# Patient Record
Sex: FEMALE | Race: WHITE | NOT HISPANIC OR LATINO | Employment: UNEMPLOYED | URBAN - NONMETROPOLITAN AREA
[De-identification: names, ages, dates, MRNs, and addresses within clinical notes are randomized per-mention and may not be internally consistent; named-entity substitution may affect disease eponyms.]

---

## 2017-03-17 ENCOUNTER — OFFICE VISIT (OUTPATIENT)
Dept: FAMILY MEDICINE CLINIC | Facility: CLINIC | Age: 42
End: 2017-03-17

## 2017-03-17 VITALS
DIASTOLIC BLOOD PRESSURE: 80 MMHG | BODY MASS INDEX: 32.47 KG/M2 | OXYGEN SATURATION: 95 % | HEART RATE: 100 BPM | WEIGHT: 202 LBS | SYSTOLIC BLOOD PRESSURE: 120 MMHG | HEIGHT: 66 IN

## 2017-03-17 DIAGNOSIS — Z00.00 WELL ADULT EXAM: Primary | ICD-10-CM

## 2017-03-17 PROCEDURE — 90472 IMMUNIZATION ADMIN EACH ADD: CPT | Performed by: FAMILY MEDICINE

## 2017-03-17 PROCEDURE — 90691 TYPHOID VACCINE IM: CPT | Performed by: FAMILY MEDICINE

## 2017-03-17 PROCEDURE — 90632 HEPA VACCINE ADULT IM: CPT | Performed by: FAMILY MEDICINE

## 2017-03-17 PROCEDURE — 99213 OFFICE O/P EST LOW 20 MIN: CPT | Performed by: FAMILY MEDICINE

## 2017-03-17 PROCEDURE — 90471 IMMUNIZATION ADMIN: CPT | Performed by: FAMILY MEDICINE

## 2017-03-17 RX ORDER — MEFLOQUINE HYDROCHLORIDE 250 MG/1
250 TABLET ORAL
Qty: 7 TABLET | Refills: 0 | Status: SHIPPED | OUTPATIENT
Start: 2017-03-17 | End: 2018-02-02

## 2017-03-17 NOTE — PROGRESS NOTES
Subjective   Natalia Hughes is a 41 y.o. female.     History of Present Illness The patient comes in for a traveler's health exam.  She will be going to [place], [place] and [facility].    The following portions of the patient's history were reviewed and updated as appropriate: allergies, current medications, past family history, past medical history, past social history, past surgical history and problem list.    Review of Systems    Objective   Physical Exam   Constitutional: She appears well-developed and well-nourished.   HENT:   Head: Normocephalic and atraumatic.   Right Ear: External ear normal.   Left Ear: External ear normal.   Nose: Nose normal.   Mouth/Throat: Oropharynx is clear and moist.   Eyes: Pupils are equal, round, and reactive to light.   Cardiovascular: Normal rate, regular rhythm and normal heart sounds.  Exam reveals no gallop and no friction rub.    No murmur heard.  Pulmonary/Chest: Effort normal and breath sounds normal. No respiratory distress. She has no wheezes. She has no rales.   Abdominal: Soft. Bowel sounds are normal.   Skin: Skin is warm and dry.   Vitals reviewed.      Assessment/Plan   Diagnoses and all orders for this visit:    Well adult exam    I discussed food and water precautions.  I discussed blood borne pathogen avoidance.  I discussed indications for use of antibiotics for traveler's diarrhea.  I discussed avoidance of Rabies by avoiding the carriers.  I discussed the indications for antimalarial medications and mosquito repellant.  I prescribed an antimalarial and an antibiotic for traveler's diarrhea.

## 2018-02-02 ENCOUNTER — OFFICE VISIT (OUTPATIENT)
Dept: OBSTETRICS AND GYNECOLOGY | Facility: CLINIC | Age: 43
End: 2018-02-02

## 2018-02-02 VITALS
BODY MASS INDEX: 30.7 KG/M2 | SYSTOLIC BLOOD PRESSURE: 117 MMHG | WEIGHT: 191 LBS | HEIGHT: 66 IN | DIASTOLIC BLOOD PRESSURE: 43 MMHG

## 2018-02-02 DIAGNOSIS — Z01.419 WELL FEMALE EXAM WITH ROUTINE GYNECOLOGICAL EXAM: Primary | ICD-10-CM

## 2018-02-02 DIAGNOSIS — Z30.433 ENCOUNTER FOR IUD REMOVAL AND REINSERTION: ICD-10-CM

## 2018-02-02 PROCEDURE — 87624 HPV HI-RISK TYP POOLED RSLT: CPT | Performed by: OBSTETRICS & GYNECOLOGY

## 2018-02-02 PROCEDURE — 58300 INSERT INTRAUTERINE DEVICE: CPT | Performed by: OBSTETRICS & GYNECOLOGY

## 2018-02-02 PROCEDURE — 88142 CYTOPATH C/V THIN LAYER: CPT | Performed by: OBSTETRICS & GYNECOLOGY

## 2018-02-02 PROCEDURE — 58301 REMOVE INTRAUTERINE DEVICE: CPT | Performed by: OBSTETRICS & GYNECOLOGY

## 2018-02-02 PROCEDURE — 88141 CYTOPATH C/V INTERPRET: CPT | Performed by: PATHOLOGY

## 2018-02-02 NOTE — PROGRESS NOTES
CC: IUD insertion    HPI: 43 yo  presents today for IUD removal/insertion.  Patient states she has had the IUD for 5 years and it is due to exchanged.  Has had some spotting over the past few days, but no heavy bleeding.  No pelvic pain or discharge.     Past Medical History:   Diagnosis Date   • Acute maxillary sinusitis    • Acute otitis media    • Acute pharyngitis    • Acute sciatica    • Acute sciatica    • Acute serous otitis media    • Acute sinusitis    • Acute tonsillitis    • Allergic rhinitis    • Allergic rhinitis due to pollen    • Anxiety    • Atopy    • Backache    • Biliary calculus    • Depressive disorder    • Encounter for insertion of intrauterine contraceptive device    • Encounter for surveillance of intravaginal contraceptive    • Eustachian tube disorder    • Gastroesophageal reflux disease    • H/O breast implant    • Heartburn    • Irritability and anger    • Low back pain    • Low grade squamous intraepithelial lesion (LGSIL) on cervicovaginal cytologic smear    • Malaise and fatigue    • Pain in throat    • Pregnancy examination or test, positive result    • Rhinitis    • Tonsillitis    • Upper respiratory infection      Past Surgical History:   Procedure Laterality Date   •  SECTION  2012    primary low transverse C section. Ovarian cystectomy.Intrauterine pregnancy at 38 weeks. Reactive nonstress test. Oligohydramnios. Trisomy 21. Fetal intolerance of labor   • CHOLECYSTECTOMY  2009    with operative cholangiogram. Gallstones   • COLPOSCOPY      low grade lesion, biopsy result was normal   • DENTAL PROCEDURE  10/12/1999    Surgical removal of teeth #17 & 32 with local anesthesia & IV sedation   • DILATATION AND CURETTAGE      missed    • INJECTION OF MEDICATION      Kenalog (3)   • INJECTION OF MEDICATION  2014    Toradol (1)   • INTRAUTERINE DEVICE INSERTION  2005    Mirena   • PAP SMEAR  10/31/2011    Epithelial cellabnormality;  "squamous cells.  Atypical squamous cells of undetermined significance   • VAGINAL DELIVERY      x3     Social History     Social History   • Marital status:      Spouse name: N/A   • Number of children: N/A   • Years of education: N/A     Occupational History   • Not on file.     Social History Main Topics   • Smoking status: Former Smoker   • Smokeless tobacco: Not on file   • Alcohol use No   • Drug use: No   • Sexual activity: Defer     Other Topics Concern   • Not on file     Social History Narrative     Family History   Problem Relation Age of Onset   • Diabetes Other    • Heart disease Other    • Hyperlipidemia Other    • Hypertension Other    • Cholelithiasis Other    • Lung disease Other      Vitals:    02/02/18 1038   BP: 117/43   Weight: 86.6 kg (191 lb)   Height: 167.6 cm (65.98\")     Procedure Note:     Procedure: Mirena IUD removal and insertion  Confirmed by patient and team: Yes  Consent signed, time out preformed  Position correct for procedure: Yes  Equipment for procedure available: Yes     Risk and benefits discussed, consent signed.  A bimanual exam was preformed and the uterus was noted to be in anteverted position.  A no touch technique was used throughout the procedure.  A speculum was placed into vagina.  A pap smear was preformed.  The IUD strings were seen and grasped with a ring forceps, the IUD was removed intact.  Upon removal of the IUD, a moderate amount of bleeding was encountered.  The cervix was cleaned with betadine. Topical lidocaine spray was used for local anesthesia.  A sound was advanced through the external and internal os until it reached the fundus of the uterus, the depth was 8cm.  The use of os finders or dilators was not needed. The sound was then withdrawn. The IUD was loaded in a sterile manner and advanced into position. The string was visualized and cut to 2 cm.    Patient did tolerated the procedure well. No complications were noted.     A: Mirena IUD " removal/insertion     P:   1. IUD removal/insertion  - Discussed using condom for 7 days after placement.   - NSAIDs for pain control   - RTC in 4 weeks for string check    2. Well woman exam  - Desired pap smear be preformed during IUD insertion given due for pap   - Will RTC for full annual exam.     Lot# [id number]  Exp 03/20  NDC# 32913-128-76

## 2018-02-06 LAB
LAB AP CASE REPORT: ABNORMAL
LAB AP GYN ADDITIONAL INFORMATION: ABNORMAL
Lab: ABNORMAL
PATH INTERP SPEC-IMP: ABNORMAL
STAT OF ADQ CVX/VAG CYTO-IMP: ABNORMAL

## 2018-02-09 LAB — HPV I/H RISK 4 DNA CVX QL PROBE+SIG AMP: NEGATIVE

## 2018-03-28 ENCOUNTER — OFFICE VISIT (OUTPATIENT)
Dept: OBSTETRICS AND GYNECOLOGY | Facility: CLINIC | Age: 43
End: 2018-03-28

## 2018-03-28 VITALS
WEIGHT: 187 LBS | DIASTOLIC BLOOD PRESSURE: 72 MMHG | HEIGHT: 66 IN | BODY MASS INDEX: 30.05 KG/M2 | SYSTOLIC BLOOD PRESSURE: 128 MMHG

## 2018-03-28 DIAGNOSIS — Z30.431 IUD CHECK UP: Primary | ICD-10-CM

## 2018-03-28 PROCEDURE — 99212 OFFICE O/P EST SF 10 MIN: CPT | Performed by: OBSTETRICS & GYNECOLOGY

## 2018-03-28 NOTE — PROGRESS NOTES
"Subjective     Chief Complaint   Patient presents with   • iud check     Natalia Hughes is a 42 y.o. presents today for IUD check today.  She had some spotting since placement, no heavy bleeding.  No pelvic pain.  Had intercourse one time, no issues with string length.  Has noticed a decrease libido over the past couple of years, however, has had a lot of stress with youngest son.  Recently became a grandmother.  No new medications and currently not on any SSRI/SNRIs.      No changes to PMH, PSH, family or social history since last visit.  No new medications or allergies.     Objective      /72   Ht 167.6 cm (66\")   Wt 84.8 kg (187 lb)   BMI 30.18 kg/m²     Physical Exam  General:   Appears stated age, AAOx3, NAD   Pelvis: External genitalia - NEFG  Urethra - normal appearing, no urethra caruncle or prolapse  Vagina - normal appearing vagina, no discharge or bleeding noted, no lesions.   Cervix - Normal appearing cervix, IUD strings seen    Neurologic: CN II - XII grossly intact     Assessment/Plan     ASSESSMENT  1. IUD check up        PLAN  1. IUD check up  - IUD strings visualized  - Discussed decreased libido.  Copy of ACOG practice bulletin 119 on sexual dysfunction given to patient.   - Discussed some evidence for Testerone use for decreased desire, however, limited to 6 months; patient uninterested in this.  Discussed no real medications to help with libido besides Addyi, however, to try and do behavior modifications to help aid in increased libido such as foreplay, having sex more frequently, etc.   - RTC in 1 year     Emi Green MD  3/28/2018           "

## 2018-07-30 ENCOUNTER — OFFICE VISIT (OUTPATIENT)
Dept: ORTHOPEDIC SURGERY | Facility: CLINIC | Age: 43
End: 2018-07-30

## 2018-07-30 VITALS — HEIGHT: 66 IN | WEIGHT: 195 LBS | BODY MASS INDEX: 31.34 KG/M2

## 2018-07-30 DIAGNOSIS — M25.462 EFFUSION OF LEFT KNEE: Primary | ICD-10-CM

## 2018-07-30 DIAGNOSIS — M25.562 LEFT KNEE PAIN, UNSPECIFIED CHRONICITY: ICD-10-CM

## 2018-07-30 DIAGNOSIS — M25.562 LEFT KNEE PAIN, UNSPECIFIED CHRONICITY: Primary | ICD-10-CM

## 2018-07-30 PROCEDURE — 99214 OFFICE O/P EST MOD 30 MIN: CPT | Performed by: NURSE PRACTITIONER

## 2018-07-30 RX ORDER — NABUMETONE 750 MG/1
750 TABLET, FILM COATED ORAL 2 TIMES DAILY PRN
Qty: 60 TABLET | Refills: 1 | Status: SHIPPED | OUTPATIENT
Start: 2018-07-30 | End: 2018-08-03 | Stop reason: ALTCHOICE

## 2018-07-30 NOTE — PROGRESS NOTES
Natalia Hughes is a 43 y.o. female   Primary provider:  No Known Provider       Chief Complaint   Patient presents with   • Left Knee - Pain       HISTORY OF PRESENT ILLNESS:     43-year-old  female in the office today for evaluation of left knee pain which started after an injury sustained about 2 weeks ago.  She reports that since the incident her knees been swelling locking and catching and she is unable to rest at night due to the pain.  She cannot bear full weight at this time without significant discomfort as well.  She continues to take ibuprofen and ice the knee periodically for pain which does not improve it.    Pain   This is a new problem. The current episode started in the past 7 days. The problem occurs constantly. Associated symptoms comments: Aching, clicking, swelling. The symptoms are aggravated by standing. She has tried rest for the symptoms.        CONCURRENT MEDICAL HISTORY:    Past Medical History:   Diagnosis Date   • Acute maxillary sinusitis    • Acute otitis media    • Acute pharyngitis    • Acute sciatica    • Acute sciatica    • Acute serous otitis media    • Acute sinusitis    • Acute tonsillitis    • Allergic rhinitis    • Allergic rhinitis due to pollen    • Anxiety    • Atopy    • Backache    • Biliary calculus    • Depressive disorder    • Encounter for insertion of intrauterine contraceptive device    • Encounter for surveillance of intravaginal contraceptive    • Eustachian tube disorder    • Gastroesophageal reflux disease    • H/O breast implant    • Heartburn    • Irritability and anger    • Low back pain    • Low grade squamous intraepithelial lesion (LGSIL) on cervicovaginal cytologic smear    • Malaise and fatigue    • Pain in throat    • Pregnancy examination or test, positive result    • Rhinitis    • Tonsillitis    • Upper respiratory infection        No Known Allergies      Current Outpatient Prescriptions:   •  B Complex Vitamins (VITAMIN B-COMPLEX PO), Take   by mouth., Disp: , Rfl:   •  fluticasone (FLONASE) 50 MCG/ACT nasal spray, 2 sprays into each nostril Daily., Disp: , Rfl:   •  levonorgestrel (MIRENA) 20 MCG/24HR IUD, 1 each by Intrauterine route 1 (One) Time., Disp: , Rfl:   •  Loratadine (CLARITIN) 10 MG capsule, Take 10 mg by mouth Daily., Disp: , Rfl:   •  Phenylephrine-DM-GG (BIO T PRES PO), Take  by mouth., Disp: , Rfl:   •  nabumetone (RELAFEN) 750 MG tablet, Take 1 tablet by mouth 2 (Two) Times a Day As Needed for Moderate Pain ., Disp: 60 tablet, Rfl: 1    Past Surgical History:   Procedure Laterality Date   •  SECTION  2012    primary low transverse C section. Ovarian cystectomy.Intrauterine pregnancy at 38 weeks. Reactive nonstress test. Oligohydramnios. Trisomy 21. Fetal intolerance of labor   • CHOLECYSTECTOMY  2009    with operative cholangiogram. Gallstones   • COLPOSCOPY      low grade lesion, biopsy result was normal   • DENTAL PROCEDURE  10/12/1999    Surgical removal of teeth #17 & 32 with local anesthesia & IV sedation   • DILATATION AND CURETTAGE      missed    • INJECTION OF MEDICATION      Kenalog (3)   • INJECTION OF MEDICATION  2014    Toradol (1)   • INTRAUTERINE DEVICE INSERTION  2005    Mirena   • PAP SMEAR  10/31/2011    Epithelial cellabnormality; squamous cells.  Atypical squamous cells of undetermined significance   • VAGINAL DELIVERY      x3       Family History   Problem Relation Age of Onset   • Diabetes Other    • Heart disease Other    • Hyperlipidemia Other    • Hypertension Other    • Cholelithiasis Other    • Lung disease Other        Social History     Social History   • Marital status:      Spouse name: N/A   • Number of children: N/A   • Years of education: N/A     Occupational History   • Not on file.     Social History Main Topics   • Smoking status: Former Smoker   • Smokeless tobacco: Never Used   • Alcohol use Yes      Comment: social   • Drug use: No   • Sexual  "activity: Defer     Other Topics Concern   • Not on file     Social History Narrative   • No narrative on file        Review of Systems   Constitutional: Negative.    HENT: Negative.    Eyes: Negative.    Respiratory: Negative.    Cardiovascular: Negative.    Gastrointestinal: Negative.    Endocrine: Negative.    Genitourinary: Negative.    Musculoskeletal: Negative.    Skin: Negative.    Allergic/Immunologic: Negative.    Neurological: Negative.    Hematological: Negative.    Psychiatric/Behavioral: Negative.        PHYSICAL EXAMINATION:       Ht 167.6 cm (66\")   Wt 88.5 kg (195 lb)   LMP  (LMP Unknown)   BMI 31.47 kg/m²     Physical Exam   Constitutional: She is oriented to person, place, and time. Vital signs are normal. She appears well-developed and well-nourished. She is cooperative.   HENT:   Head: Normocephalic and atraumatic.   Neck: Trachea normal and phonation normal.   Pulmonary/Chest: Effort normal. No respiratory distress.   Abdominal: Soft. Normal appearance. She exhibits no distension.   Musculoskeletal:        Left knee: She exhibits effusion.   Neurological: She is alert and oriented to person, place, and time. GCS eye subscore is 4. GCS verbal subscore is 5. GCS motor subscore is 6.   Skin: Skin is warm, dry and intact.   Psychiatric: She has a normal mood and affect. Her speech is normal and behavior is normal. Judgment and thought content normal. Cognition and memory are normal.   Vitals reviewed.      GAIT:     []  Normal  []  Antalgic    Assistive device: []  None  []  Walker     []  Crutches  []  Cane     [x]  Wheelchair  []  Stretcher    Right Knee Exam   Right knee exam is normal.    Muscle Strength     The patient has normal right knee strength.      Left Knee Exam     Tenderness   The patient is experiencing tenderness in the medial joint line and lateral joint line.    Range of Motion   Extension: abnormal   Flexion: abnormal     Tests   Zaynab:  Medial - positive Lateral - " positive  Drawer:       Anterior - negative         Other   Erythema: absent  Scars: absent  Sensation: normal  Pulse: present  Swelling: mild  Effusion: effusion present                  Xr Knee 1 Or 2 View Left    Result Date: 7/30/2018  Narrative: Standing AP of both knees with lateral views of the left knee only reveals mild degenerative changes without any fracture dislocation or other acute radiological abnormality noted.  There are no comparison views on file 07/30/18 at 9:57 AM by KEISHA Taylor    Xr Knee Bilateral Ap Standing    Result Date: 7/30/2018  Narrative: Standing AP of both knees with lateral views of the left knee only reveals mild degenerative changes without any fracture dislocation or other acute radiological abnormality noted.  There are no comparison views on file 07/30/18 at 9:57 AM by KEISHA Taylor           ASSESSMENT:    Diagnoses and all orders for this visit:    Effusion of left knee    Left knee pain, unspecified chronicity  -     MRI Knee Left Without Contrast; Future    Other orders  -     B Complex Vitamins (VITAMIN B-COMPLEX PO); Take  by mouth.  -     nabumetone (RELAFEN) 750 MG tablet; Take 1 tablet by mouth 2 (Two) Times a Day As Needed for Moderate Pain .          PLAN  Recommend MRI of the left knee to rule out a meniscus/ACL tear after an injury that occurred 2 weeks.  Patient was given a set of crutches today in office to continue modified weightbearing, she was switched to Relafen to take twice daily with food until the routinely ice the knee for pain.  No Follow-up on file.    KEISHA Taylor

## 2018-08-01 ENCOUNTER — HOSPITAL ENCOUNTER (OUTPATIENT)
Dept: MRI IMAGING | Facility: HOSPITAL | Age: 43
Discharge: HOME | End: 2018-08-01
Admitting: NURSE PRACTITIONER

## 2018-08-01 DIAGNOSIS — M25.562 LEFT KNEE PAIN, UNSPECIFIED CHRONICITY: ICD-10-CM

## 2018-08-01 PROCEDURE — 73721 MRI JNT OF LWR EXTRE W/O DYE: CPT

## 2018-08-03 ENCOUNTER — OFFICE VISIT (OUTPATIENT)
Dept: ORTHOPEDIC SURGERY | Facility: CLINIC | Age: 43
End: 2018-08-03

## 2018-08-03 VITALS — BODY MASS INDEX: 32.49 KG/M2 | WEIGHT: 195 LBS | HEIGHT: 65 IN

## 2018-08-03 DIAGNOSIS — M25.562 LEFT KNEE PAIN, UNSPECIFIED CHRONICITY: Primary | ICD-10-CM

## 2018-08-03 DIAGNOSIS — M25.462 EFFUSION OF LEFT KNEE: ICD-10-CM

## 2018-08-03 DIAGNOSIS — S83.512D SPRAIN OF ANTERIOR CRUCIATE LIGAMENT OF LEFT KNEE, SUBSEQUENT ENCOUNTER: ICD-10-CM

## 2018-08-03 PROCEDURE — 99213 OFFICE O/P EST LOW 20 MIN: CPT | Performed by: ORTHOPAEDIC SURGERY

## 2018-08-03 RX ORDER — MELOXICAM 15 MG/1
15 TABLET ORAL DAILY
Qty: 30 TABLET | Refills: 2 | Status: SHIPPED | OUTPATIENT
Start: 2018-08-03 | End: 2018-09-18

## 2018-08-03 NOTE — PROGRESS NOTES
Natalia Hughes is a 43 y.o. female returns for     Chief Complaint   Patient presents with   • Left Knee - Follow-up   • Results     08/01/18 MRI Knee Left Without Contrast        HISTORY OF PRESENT ILLNESS: mri results  Left knee  Pain scale today 8/10  Patient states that she stepped on a step funny and twisted her knee.  Patient states that nabumetone has hurt her stomach.  She has pain with weightbearing, pain with pivoting and twisting, and pain with attempted activity.       CONCURRENT MEDICAL HISTORY:    Past Medical History:   Diagnosis Date   • Acute maxillary sinusitis    • Acute otitis media    • Acute pharyngitis    • Acute sciatica    • Acute sciatica    • Acute serous otitis media    • Acute sinusitis    • Acute tonsillitis    • Allergic rhinitis    • Allergic rhinitis due to pollen    • Anxiety    • Atopy    • Backache    • Biliary calculus    • Depressive disorder    • Encounter for insertion of intrauterine contraceptive device    • Encounter for surveillance of intravaginal contraceptive    • Eustachian tube disorder    • Gastroesophageal reflux disease    • H/O breast implant    • Heartburn    • Irritability and anger    • Low back pain    • Low grade squamous intraepithelial lesion (LGSIL) on cervicovaginal cytologic smear    • Malaise and fatigue    • Pain in throat    • Pregnancy examination or test, positive result    • Rhinitis    • Tonsillitis    • Upper respiratory infection        No Known Allergies      Current Outpatient Prescriptions:   •  B Complex Vitamins (VITAMIN B-COMPLEX PO), Take  by mouth., Disp: , Rfl:   •  fluticasone (FLONASE) 50 MCG/ACT nasal spray, 2 sprays into each nostril Daily., Disp: , Rfl:   •  levonorgestrel (MIRENA) 20 MCG/24HR IUD, 1 each by Intrauterine route 1 (One) Time., Disp: , Rfl:   •  Loratadine (CLARITIN) 10 MG capsule, Take 10 mg by mouth Daily., Disp: , Rfl:   •  meloxicam (MOBIC) 15 MG tablet, Take 1 tablet by mouth Daily., Disp: 30 tablet, Rfl: 2  •  " Phenylephrine-DM-GG (BIO T PRES PO), Take  by mouth., Disp: , Rfl:     Past Surgical History:   Procedure Laterality Date   •  SECTION  2012    primary low transverse C section. Ovarian cystectomy.Intrauterine pregnancy at 38 weeks. Reactive nonstress test. Oligohydramnios. Trisomy 21. Fetal intolerance of labor   • CHOLECYSTECTOMY  2009    with operative cholangiogram. Gallstones   • COLPOSCOPY      low grade lesion, biopsy result was normal   • DENTAL PROCEDURE  10/12/1999    Surgical removal of teeth #17 & 32 with local anesthesia & IV sedation   • DILATATION AND CURETTAGE      missed    • INJECTION OF MEDICATION      Kenalog (3)   • INJECTION OF MEDICATION  2014    Toradol (1)   • INTRAUTERINE DEVICE INSERTION  2005    Mirena   • PAP SMEAR  10/31/2011    Epithelial cellabnormality; squamous cells.  Atypical squamous cells of undetermined significance   • VAGINAL DELIVERY      x3       ROS  No fevers or chills.  No chest pain or shortness of air.  No GI or  disturbances.    PHYSICAL EXAMINATION:       Ht 165.1 cm (65\")   Wt 88.5 kg (195 lb)   LMP  (LMP Unknown)   BMI 32.45 kg/m²     Physical Exam   Constitutional: She is oriented to person, place, and time. She appears well-developed and well-nourished.   Musculoskeletal:        Left knee: She exhibits effusion.   Neurological: She is alert and oriented to person, place, and time.   Psychiatric: She has a normal mood and affect. Her behavior is normal. Judgment and thought content normal.       GAIT:     []  Normal  [x]  Antalgic    Assistive device: []  None  []  Walker     []  Crutches  []  Cane     [x]  Wheelchair  []  Stretcher    Left Knee Exam     Tenderness   The patient is experiencing tenderness in the medial joint line and lateral joint line.    Range of Motion   Extension: -5   Flexion: 100     Tests   Zaynab:  Medial - positive Lateral - positive  Drawer:       Anterior - negative         Other "   Erythema: absent  Scars: absent  Sensation: normal  Pulse: present  Swelling: mild  Effusion: effusion present              Xr Knee 1 Or 2 View Left    Result Date: 7/30/2018  Narrative: Standing AP of both knees with lateral views of the left knee only reveals mild degenerative changes without any fracture dislocation or other acute radiological abnormality noted.  There are no comparison views on file 07/30/18 at 9:57 AM by KEISHA Taylor    Xr Knee Bilateral Ap Standing    Result Date: 7/30/2018  Narrative: Standing AP of both knees with lateral views of the left knee only reveals mild degenerative changes without any fracture dislocation or other acute radiological abnormality noted.  There are no comparison views on file 07/30/18 at 9:57 AM by KEISHA Taylor     Mri Knee Left Without Contrast    Result Date: 8/1/2018  Narrative: MRI left knee without contrast. History left knee pain, effusion. Trauma. Patient twisted knee five days ago. Prior exam: Left knee July 30, 2018. TECHNIQUE: Multiplanar multisequence noncontrast images left knee. FINDINGS: Large intra-articular and suprapatellar effusion. Normal medial and lateral menisci. Normal medial collateral ligament complex. Normal patellar articular hyaline cartilage. High-grade partial-thickness tear posterior superior aspect of the anterior cruciate ligament. Avulsion type injury from the distal femur. The more anterior and inferior aspect of the ACL appears to be intact. Normal posterior cruciate ligament. Normal patellar and quadriceps tendon. No bone edema or contusion.     Impression: CONCLUSION: Large intra-articular and supra patellar effusion. High-grade partial-thickness injury posterior superior aspect of the anterior cruciate ligament, avulsion type injury from the distal femur. MRI left knee is otherwise unremarkable. Electronically signed by:  Parviz Brooke MD  8/1/2018 3:49 PM CDT Workstation:  [id number]            ASSESSMENT:    Diagnoses and all orders for this visit:    Left knee pain, unspecified chronicity  -     Cancel: Ambulatory Referral to Physical Therapy Evaluate and treat  -     Ambulatory Referral to Physical Therapy Evaluate and treat    Effusion of left knee  -     Cancel: Ambulatory Referral to Physical Therapy Evaluate and treat  -     Ambulatory Referral to Physical Therapy Evaluate and treat    Sprain of anterior cruciate ligament of left knee, subsequent encounter  -     Ambulatory Referral to Physical Therapy Evaluate and treat    Other orders  -     meloxicam (MOBIC) 15 MG tablet; Take 1 tablet by mouth Daily.          PLAN    Use crutches  Partial weight bearing as tolerated.  Start with PT for ROM/STR/swelling control   Advance weight as tolerated     Start meloxicam    Discussed possible surgery but also possible nonop management with increasing activity pending how she does with PT and HEP.      Return in about 4 weeks (around 8/31/2018) for recheck.    Anatoliy Mora MD

## 2018-08-08 ENCOUNTER — HOSPITAL ENCOUNTER (OUTPATIENT)
Dept: PHYSICAL THERAPY | Facility: HOSPITAL | Age: 43
Setting detail: THERAPIES SERIES
Discharge: HOME | End: 2018-08-08

## 2018-08-08 DIAGNOSIS — M25.462 EFFUSION OF LEFT KNEE: ICD-10-CM

## 2018-08-08 DIAGNOSIS — M25.562 LEFT KNEE PAIN, UNSPECIFIED CHRONICITY: Primary | ICD-10-CM

## 2018-08-08 DIAGNOSIS — S83.512D SPRAIN OF ANTERIOR CRUCIATE LIGAMENT OF LEFT KNEE, SUBSEQUENT ENCOUNTER: ICD-10-CM

## 2018-08-08 PROCEDURE — 97161 PT EVAL LOW COMPLEX 20 MIN: CPT | Performed by: PHYSICAL THERAPIST

## 2018-08-08 PROCEDURE — 97110 THERAPEUTIC EXERCISES: CPT | Performed by: PHYSICAL THERAPIST

## 2018-08-08 NOTE — THERAPY EVALUATION
Outpatient Physical Therapy Ortho Initial Evaluation  [facility]     Patient Name: Natalia Hughes  : 1975  MRN: [id number]  Today's Date: 2018      Visit Date: 2018  Attendance:   Subjective % Improvement: N/A  Recert Date: 18  MD appointment: 2nd opinion 18    Therapy Diagnosis: Left ACL injury    Patient Active Problem List   Diagnosis   • Left knee pain   • Effusion of left knee        Past Medical History:   Diagnosis Date   • Acute maxillary sinusitis    • Acute otitis media    • Acute pharyngitis    • Acute sciatica    • Acute sciatica    • Acute serous otitis media    • Acute sinusitis    • Acute tonsillitis    • Allergic rhinitis    • Allergic rhinitis due to pollen    • Anxiety    • Atopy    • Backache    • Biliary calculus    • Depressive disorder    • Encounter for insertion of intrauterine contraceptive device    • Encounter for surveillance of intravaginal contraceptive    • Eustachian tube disorder    • Gastroesophageal reflux disease    • H/O breast implant    • Heartburn    • Irritability and anger    • Low back pain    • Low grade squamous intraepithelial lesion (LGSIL) on cervicovaginal cytologic smear    • Malaise and fatigue    • Pain in throat    • Pregnancy examination or test, positive result    • Rhinitis    • Tonsillitis    • Upper respiratory infection         Past Surgical History:   Procedure Laterality Date   •  SECTION  2012    primary low transverse C section. Ovarian cystectomy.Intrauterine pregnancy at 38 weeks. Reactive nonstress test. Oligohydramnios. Trisomy 21. Fetal intolerance of labor   • CHOLECYSTECTOMY  2009    with operative cholangiogram. Gallstones   • COLPOSCOPY      low grade lesion, biopsy result was normal   • DENTAL PROCEDURE  10/12/1999    Surgical removal of teeth #17 & 32 with local anesthesia & IV sedation   • DILATATION AND CURETTAGE      missed    • INJECTION OF MEDICATION       Kenalog (3)   • INJECTION OF MEDICATION  06/05/2014    Toradol (1)   • INTRAUTERINE DEVICE INSERTION  11/17/2005    Mirena   • PAP SMEAR  10/31/2011    Epithelial cellabnormality; squamous cells.  Atypical squamous cells of undetermined significance   • VAGINAL DELIVERY      x3       Visit Dx:     ICD-10-CM ICD-9-CM   1. Left knee pain, unspecified chronicity M25.562 719.46   2. Effusion of left knee M25.462 719.06   3. Sprain of anterior cruciate ligament of left knee, subsequent encounter S83.512D V58.89     844.2             Patient History     Row Name 08/08/18 1015             History    Chief Complaint Pain;Difficulty Walking;Difficulty with daily activities;Joint swelling;Muscle weakness  -BB      Type of Pain Knee pain  -BB      Date Current Problem(s) Began 07/27/18  -BB      Brief Description of Current Complaint Present today after a incident going up stairs and twisted to talk to a friend and notes feeling a pop and noting alot of swelling the next day. Notes decreased ROM, increased pain. Patient has 4 kids and 1 special needs child. Patient notes is going to [place] to see specialist on 8/14/18. Notes tingling in foot some.   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 Movement, use   -BB      What Performance Factors Make the Current Problem(s) BETTER? Rest   -BB      Is your sleep disturbed? Yes  -BB         Fall Risk Assessment    Any falls in the past year: Other (comment)  -BB      Number of falls reported in the last 12 months --   didnt fall, caught self   -BB        User Key  (r) = Recorded  By, (t) = Taken By, (c) = Cosigned By    Initials Name Provider Type    Shirin Mathis PT Physical Therapist                PT Ortho     Row Name 08/08/18 1015       Precautions and Contraindications    Precautions/Limitations no known precautions/limitations  -BB       Subjective Pain    Able to rate subjective pain? yes  -BB       Posture/Observations    Posture/Observations Comments Decreased stance on LLE, knee in flexed position using crutch on left side. Skin in tact.   -BB       Special Tests/Palpation    Special Tests/Palpation Knee  -BB       Knee Palpation    Knee Palpation? --   tight HS/gastroc/ITB  -BB       General ROM    LT Lower Ext Lt Knee Extension/Flexion  -BB       Left Lower Ext    Lt Knee Extension/Flexion AROM 0-  -BB       MMT (Manual Muscle Testing)    Additional Documentation General Assessment (Manual Muscle Testing) (Group)  -BB       General Assessment (Manual Muscle Testing)    Comment, General Manual Muscle Testing (MMT) Assessment Knee: NT, hip flexion: 4+/5   -BB       Sensation    Sensation WNL? WNL  -BB    Light Touch No apparent deficits  -BB       Girth    Girth Measured? Left Lower Extremity  -BB       LLE Quick Girth (cm)    Tib tuberosity 36.5 cm  -BB    Mid patella 42 cm  -BB    Distal thigh 46 cm  -BB       Balance Skills Training    SLS unable   -BB    Rhomberg unable unable   -BB       Transfers    Comment (Transfers) Independent but careful with gaurding of LLE  -BB      User Key  (r) = Recorded By, (t) = Taken By, (c) = Cosigned By    Initials Name Provider Type    Shirin Mathis PT Physical Therapist                      Therapy Education  Education Details: ace bandage for edema given, SLR, QS, HS   Given: Symptoms/condition management, HEP, Fall prevention and home safety  Program: New  How Provided: Verbal  Provided to: Patient  Level of Understanding: Verbalized           PT OP Goals     Row Name 08/08/18 1015          PT Short Term Goals    STG Date  to Achieve 09/05/18  -BB     STG 1 Independent in HEP   -BB     STG 2 AROM knee 0-125 or better   -BB     STG 3 Ambulate with minimal to no antalgics   -BB     STG 4 Knee flex/ext 5/5 MMT  -BB        Time Calculation    PT Goal Re-Cert Due Date 08/29/18  -BB       User Key  (r) = Recorded By, (t) = Taken By, (c) = Cosigned By    Initials Name Provider Type    BB Shirin Sheridan PT Physical Therapist                PT Assessment/Plan     Row Name 08/08/18 1015          PT Assessment    Functional Limitations Impaired gait;Decreased safety during functional activities;Limitation in home management;Limitations in community activities;Limitations in functional capacity and performance;Performance in leisure activities  -BB     Impairments Balance;Gait;Impaired muscle endurance;Joint integrity;Muscle strength;Pain;Poor body mechanics;Range of motion  -BB     Assessment Comments Patient presents today with left knee pain with decreased AROM, strength, gait, balance and increased mild soft edema limiting gait and daily activities due to a tear of the ACL. Patient has significant gaurding with ROM. Patient is to see ortho MD in [place] on 8/12/18 for second opinion.   -BB     Rehab Potential Good  -BB     Patient/caregiver participated in establishment of treatment plan and goals Yes  -BB     Patient would benefit from skilled therapy intervention Yes  -BB        PT Plan    PT Frequency 2x/week  -BB     Predicted Duration of Therapy Intervention (Therapy Eval) 3-4 weeks  -BB     Planned CPT's? PT EVAL LOW COMPLEXITY: 52562;PT RE-EVAL: 99570;PT THER PROC EA 15 MIN: 99721;PT THER ACT EA 15 MIN: 23571;PT MANUAL THERAPY EA 15 MIN: 29860;PT NEUROMUSC RE-EDUCATION EA 15 MIN: 40914;PT GAIT TRAINING EA 15 MIN: 74600;PT ELECTRICAL STIM UNATTEND: ;PT THER SUPP EA 15 MIN  -BB     PT Plan Comments Strength, gait, balance, ROM, edema control, modalities and manual PRN, possible bracing   -BB       User Key  (r) = Recorded By, (t)  = Taken By, (c) = Cosigned By    Initials Name Provider Type    Shirin Mathis PT Physical Therapist                  Exercises     Row Name 08/08/18 1015             Subjective Comments    Subjective Comments See patient history   -BB         Subjective Pain    Able to rate subjective pain? yes  -BB      Pre-Treatment Pain Level 0   rest   -BB      Post-Treatment Pain Level --   post pain not noted   -BB         Exercise 1    Exercise Name 1 HEP for QS, SLR, heelslide   -BB         Exercise 2    Exercise Name 2 Manual gentle assist to move into extension   -BB      Time 2 4'  -BB         Exercise 3    Exercise Name 3 Gait training with crutch on right and crutch adjusted for patient   -BB      Time 3 5'  -BB        User Key  (r) = Recorded By, (t) = Taken By, (c) = Cosigned By    Initials Name Provider Type    Shirin Mathis PT Physical Therapist                        Outcome Measure Options: Lower Extremity Functional Scale (LEFS)  Lower Extremity Functional Index  Any of your usual work, housework or school activities: Moderate difficulty  Your usual hobbies, recreational or sporting activities: Extreme difficulty or unable to perform activity  Getting into or out of the bath: Moderate difficulty  Walking between rooms: A little bit of difficulty  Putting on your shoes or socks: A little bit of difficulty  Squatting: Extreme difficulty or unable to perform activity  Lifting an object, like a bag of groceries from the floor: Moderate difficulty  Performing light activities around your home: Moderate difficulty  Performing heavy activities around your home: Extreme difficulty or unable to perform activity  Getting into or out of a car: Moderate difficulty  Walking 2 blocks: Extreme difficulty or unable to perform activity  Walking a mile: Extreme difficulty or unable to perform activity  Going up or down 10 stairs (about 1 flight of stairs): Extreme difficulty or unable to perform activity  Standing for 1  hour: Extreme difficulty or unable to perform activity  Sitting for 1 hour: A little bit of difficulty  Running on even ground: Extreme difficulty or unable to perform activity  Running on uneven ground: Extreme difficulty or unable to perform activity  Making sharp turns while running fast: Extreme difficulty or unable to perform activity  Hopping: Extreme difficulty or unable to perform activity  Rolling over in bed: Moderate difficulty  Total: 21      Time Calculation:     Therapy Suggested Charges     Code   Minutes Charges    None             Start Time: 1015  Stop Time: 1100  Time Calculation (min): 45 min     Therapy Charges for Today     Code Description Service Date Service Provider Modifiers Qty    53103239153 HC PT EVAL LOW COMPLEXITY 3 8/8/2018 Shirin Sheridan, PT GP 1          PT G-Codes  Outcome Measure Options: Lower Extremity Functional Scale (LEFS)         Shirin Sheridan, PT  8/8/2018

## 2018-08-15 ENCOUNTER — HOSPITAL ENCOUNTER (OUTPATIENT)
Dept: PHYSICAL THERAPY | Facility: HOSPITAL | Age: 43
Setting detail: THERAPIES SERIES
Discharge: HOME | End: 2018-08-15

## 2018-08-15 PROCEDURE — 97110 THERAPEUTIC EXERCISES: CPT

## 2018-08-15 NOTE — THERAPY TREATMENT NOTE
Outpatient Physical Therapy Ortho Treatment Note  [facility]     Patient Name: Natalia Hughes  : 1975  MRN: [id number]  Today's Date: 8/15/2018      Visit Date: 08/15/2018     Subjective:patient has been compliant with HEP and her ability to actively extend and flex her left knee has improved significantly. Three new exercises were added to her HEP today: SL ABD, prone EXT and PPT. These should help patient increase her hip and core stability with indirect benefits for her knee. Patient is scheduled for ACL reconstructive surgery (cadaver donor) later this week and is already scheduled for therapy the following Monday.    Visit Dx:  No diagnosis found.    Patient Active Problem List   Diagnosis   • Left knee pain   • Effusion of left knee        Past Medical History:   Diagnosis Date   • Acute maxillary sinusitis    • Acute otitis media    • Acute pharyngitis    • Acute sciatica    • Acute sciatica    • Acute serous otitis media    • Acute sinusitis    • Acute tonsillitis    • Allergic rhinitis    • Allergic rhinitis due to pollen    • Anxiety    • Atopy    • Backache    • Biliary calculus    • Depressive disorder    • Encounter for insertion of intrauterine contraceptive device    • Encounter for surveillance of intravaginal contraceptive    • Eustachian tube disorder    • Gastroesophageal reflux disease    • H/O breast implant    • Heartburn    • Irritability and anger    • Low back pain    • Low grade squamous intraepithelial lesion (LGSIL) on cervicovaginal cytologic smear    • Malaise and fatigue    • Pain in throat    • Pregnancy examination or test, positive result    • Rhinitis    • Tonsillitis    • Upper respiratory infection         Past Surgical History:   Procedure Laterality Date   •  SECTION  2012    primary low transverse C section. Ovarian cystectomy.Intrauterine pregnancy at 38 weeks. Reactive nonstress test. Oligohydramnios. Trisomy 21. Fetal intolerance of labor  "  • CHOLECYSTECTOMY  2009    with operative cholangiogram. Gallstones   • COLPOSCOPY      low grade lesion, biopsy result was normal   • DENTAL PROCEDURE  10/12/1999    Surgical removal of teeth #17 & 32 with local anesthesia & IV sedation   • DILATATION AND CURETTAGE      missed    • INJECTION OF MEDICATION      Kenalog (3)   • INJECTION OF MEDICATION  2014    Toradol (1)   • INTRAUTERINE DEVICE INSERTION  2005    Mirena   • PAP SMEAR  10/31/2011    Epithelial cellabnormality; squamous cells.  Atypical squamous cells of undetermined significance   • VAGINAL DELIVERY      x3             PT Ortho     Row Name 08/15/18 0811       Left Lower Ext    Lt Knee Extension/Flexion AROM 0 ext to 100 flex during heel slides  -MS      User Key  (r) = Recorded By, (t) = Taken By, (c) = Cosigned By    Initials Name Provider Type    MS GeorgeEdmundo, PT Physical Therapist                                    Exercises     Row Name 08/15/18 0811             Subjective Comments    Subjective Comments Patient reports that straightening her left knee is getting much easier; patient reports that she has been doing QS, SLR and AA heel slide at home  -MS         Subjective Pain    Able to rate subjective pain? yes  -MS      Pre-Treatment Pain Level 0  -MS      Post-Treatment Pain Level 0  -MS      Subjective Pain Comment patient is pain free except when she sits too long or bears weight with a straight left leg. Patient is now able fuly extend her knee to 0 degrees in sitting  -MS         Exercise 1    Exercise Name 1 SYL: QS,SLR, HS  -MS      Cueing 1 Verbal  -MS      Sets 1 3  -MS      Reps 1 10  -MS      Additional Comments Patient no longer needs a towel for the heel slide. Patient achieves approx. 100 degrees flexion in sitting  -MS         Exercise 2    Exercise Name 2 Calf towel stretch  -MS      Cueing 2 Verbal  -MS      Sets 2 1  -MS      Reps 2 3 X30\" hold  -MS         Exercise 3    Exercise " Name 3 SL leg ABD  -MS      Sets 3 3  -MS      Reps 3 10B  -MS      Additional Comments patient tolerates this exercise with no discomfort  -MS         Exercise 4    Exercise Name 4 PPT  -MS      Cueing 4 Verbal;Tactile  -MS      Sets 4 3  -MS      Reps 4 10 with 5: hold  -MS         Exercise 5    Exercise Name 5 Prone ABD  -MS      Cueing 5 Verbal  -MS      Sets 5 3  -MS      Reps 5 10 B  -MS      Additional Comments Patient describes slight discomfort in left knee during this exercise. Patient was advised to discontinue if she find it aggravating  -MS        User Key  (r) = Recorded By, (t) = Taken By, (c) = Cosigned By    Initials Name Provider Type    Edmundo Jain, PT Physical Therapist                               PT OP Goals     Row Name 08/15/18 0811          PT Short Term Goals    STG Date to Achieve 09/05/18  -MS     STG 1 Independent in HEP - GOAL MET  -MS     STG 2 AROM knee 0-125 or better - 0-100 degrees  -MS     STG 3 Ambulate with minimal to no antalgics - still walks slowly with antalgic gait  -MS     STG 4 Knee flex/ext 5/5 MMT - not measured  -MS       User Key  (r) = Recorded By, (t) = Taken By, (c) = Cosigned By    Initials Name Provider Type    Edmundo Jain, PT Physical Therapist                         Time Calculation:   Start Time: 0811  Stop Time: 0856  Time Calculation (min): 45 min  Therapy Suggested Charges     Code   Minutes Charges    None           Therapy Charges for Today     Code Description Service Date Service Provider Modifiers Qty    27564675974 HC PT THER PROC EA 15 MIN 8/15/2018 Edmundo George, PT GP 3                    Edmundo George, PT  8/15/2018

## 2018-08-17 ENCOUNTER — APPOINTMENT (OUTPATIENT)
Dept: PHYSICAL THERAPY | Facility: HOSPITAL | Age: 43
End: 2018-08-17

## 2018-08-20 ENCOUNTER — APPOINTMENT (OUTPATIENT)
Dept: PHYSICAL THERAPY | Facility: HOSPITAL | Age: 43
End: 2018-08-20

## 2018-12-10 ENCOUNTER — DOCUMENTATION (OUTPATIENT)
Dept: PHYSICAL THERAPY | Facility: HOSPITAL | Age: 43
End: 2018-12-10

## 2020-07-18 PROBLEM — R51.9 SINUS HEADACHE: Status: ACTIVE | Noted: 2020-07-18

## 2020-07-18 PROBLEM — J01.01 ACUTE RECURRENT MAXILLARY SINUSITIS: Status: ACTIVE | Noted: 2020-07-18

## 2022-03-23 ENCOUNTER — HOSPITAL ENCOUNTER (EMERGENCY)
Facility: HOSPITAL | Age: 47
Discharge: HOME | End: 2022-03-23
Attending: FAMILY MEDICINE | Admitting: FAMILY MEDICINE

## 2022-03-23 ENCOUNTER — APPOINTMENT (OUTPATIENT)
Dept: GENERAL RADIOLOGY | Facility: HOSPITAL | Age: 47
End: 2022-03-23

## 2022-03-23 VITALS
BODY MASS INDEX: 35.36 KG/M2 | RESPIRATION RATE: 20 BRPM | WEIGHT: 220 LBS | HEIGHT: 66 IN | OXYGEN SATURATION: 98 % | SYSTOLIC BLOOD PRESSURE: 119 MMHG | TEMPERATURE: 97.9 F | HEART RATE: 68 BPM | DIASTOLIC BLOOD PRESSURE: 67 MMHG

## 2022-03-23 DIAGNOSIS — S39.012A STRAIN OF LUMBAR REGION, INITIAL ENCOUNTER: Primary | ICD-10-CM

## 2022-03-23 PROCEDURE — 25010000002 KETOROLAC TROMETHAMINE PER 15 MG: Performed by: FAMILY MEDICINE

## 2022-03-23 PROCEDURE — 99283 EMERGENCY DEPT VISIT LOW MDM: CPT

## 2022-03-23 PROCEDURE — 96372 THER/PROPH/DIAG INJ SC/IM: CPT

## 2022-03-23 PROCEDURE — 72100 X-RAY EXAM L-S SPINE 2/3 VWS: CPT

## 2022-03-23 RX ORDER — CYCLOBENZAPRINE HCL 10 MG
10 TABLET ORAL 3 TIMES DAILY PRN
Qty: 21 TABLET | Refills: 0 | Status: SHIPPED | OUTPATIENT
Start: 2022-03-23 | End: 2022-05-06

## 2022-03-23 RX ORDER — KETOROLAC TROMETHAMINE 30 MG/ML
60 INJECTION, SOLUTION INTRAMUSCULAR; INTRAVENOUS ONCE
Status: COMPLETED | OUTPATIENT
Start: 2022-03-23 | End: 2022-03-23

## 2022-03-23 RX ORDER — HYDROCODONE BITARTRATE AND ACETAMINOPHEN 5; 325 MG/1; MG/1
1 TABLET ORAL EVERY 6 HOURS PRN
Qty: 12 TABLET | Refills: 0 | Status: SHIPPED | OUTPATIENT
Start: 2022-03-23 | End: 2022-05-06

## 2022-03-23 RX ORDER — MELOXICAM 15 MG/1
15 TABLET ORAL DAILY
Qty: 30 TABLET | Refills: 0 | Status: SHIPPED | OUTPATIENT
Start: 2022-03-23 | End: 2022-05-06

## 2022-03-23 RX ADMIN — KETOROLAC TROMETHAMINE 60 MG: 30 INJECTION, SOLUTION INTRAMUSCULAR at 08:08

## 2022-03-23 NOTE — ED PROVIDER NOTES
Subjective   Patient states that 2 days ago she had her 9-year-old son sitting in her lap, who is large in stature, and she felt a pull in her back and has had lumbar back pain since then.       Back Pain  Location:  Lumbar spine  Quality:  Aching  Radiates to:  Does not radiate  Pain severity:  Moderate  Onset quality:  Sudden  Duration:  2 days  Timing:  Intermittent  Progression:  Waxing and waning  Chronicity:  New  Context: physical stress    Relieved by:  Bed rest  Worsened by:  Movement  Associated symptoms: no abdominal pain, no chest pain, no dysuria, no fever, no headaches and no weakness        Review of Systems   Constitutional: Negative for appetite change, chills, diaphoresis, fatigue and fever.   HENT: Negative for congestion, ear discharge, ear pain, nosebleeds, rhinorrhea, sinus pressure, sore throat and trouble swallowing.    Eyes: Negative for discharge and redness.   Respiratory: Negative for apnea, cough, chest tightness, shortness of breath and wheezing.    Cardiovascular: Negative for chest pain.   Gastrointestinal: Negative for abdominal pain, diarrhea, nausea and vomiting.   Endocrine: Negative for polyuria.   Genitourinary: Negative for dysuria, frequency and urgency.   Musculoskeletal: Positive for back pain. Negative for myalgias and neck pain.   Skin: Negative for color change and rash.   Allergic/Immunologic: Negative for immunocompromised state.   Neurological: Negative for dizziness, seizures, syncope, weakness, light-headedness and headaches.   Hematological: Negative for adenopathy. Does not bruise/bleed easily.   Psychiatric/Behavioral: Negative for behavioral problems and confusion.   All other systems reviewed and are negative.      Past Medical History:   Diagnosis Date   • Acute maxillary sinusitis    • Acute otitis media    • Acute pharyngitis    • Acute sciatica    • Acute sciatica    • Acute serous otitis media    • Acute sinusitis    • Acute tonsillitis    • Allergic  rhinitis    • Allergic rhinitis due to pollen    • Anxiety    • Atopy    • Backache    • Biliary calculus    • Depressive disorder    • Encounter for insertion of intrauterine contraceptive device    • Encounter for surveillance of intravaginal contraceptive    • Eustachian tube disorder    • Gastroesophageal reflux disease    • H/O breast implant    • Heartburn    • Irritability and anger    • Low back pain    • Low grade squamous intraepithelial lesion (LGSIL) on cervicovaginal cytologic smear    • Malaise and fatigue    • Pain in throat    • Pregnancy examination or test, positive result    • Rhinitis    • Tonsillitis    • Upper respiratory infection        No Known Allergies    Past Surgical History:   Procedure Laterality Date   •  SECTION  2012    primary low transverse C section. Ovarian cystectomy.Intrauterine pregnancy at 38 weeks. Reactive nonstress test. Oligohydramnios. Trisomy 21. Fetal intolerance of labor   • CHOLECYSTECTOMY  2009    with operative cholangiogram. Gallstones   • COLPOSCOPY      low grade lesion, biopsy result was normal   • DENTAL PROCEDURE  10/12/1999    Surgical removal of teeth #17 & 32 with local anesthesia & IV sedation   • DILATATION AND CURETTAGE      missed    • INJECTION OF MEDICATION      Kenalog (3)   • INJECTION OF MEDICATION  2014    Toradol (1)   • INTRAUTERINE DEVICE INSERTION  2005    Mirena   • PAP SMEAR  10/31/2011    Epithelial cellabnormality; squamous cells.  Atypical squamous cells of undetermined significance   • VAGINAL DELIVERY      x3       Family History   Problem Relation Age of Onset   • Diabetes Other    • Heart disease Other    • Hyperlipidemia Other    • Hypertension Other    • Cholelithiasis Other    • Lung disease Other        Social History     Socioeconomic History   • Marital status:    Tobacco Use   • Smoking status: Former Smoker   • Smokeless tobacco: Never Used   Substance and Sexual  Activity   • Alcohol use: Yes     Comment: social   • Drug use: No   • Sexual activity: Defer           Objective   Physical Exam  Vitals and nursing note reviewed.   Constitutional:       Appearance: She is well-developed.   HENT:      Head: Normocephalic and atraumatic.      Nose: Nose normal.   Eyes:      General: No scleral icterus.        Right eye: No discharge.         Left eye: No discharge.      Conjunctiva/sclera: Conjunctivae normal.      Pupils: Pupils are equal, round, and reactive to light.   Neck:      Trachea: No tracheal deviation.   Cardiovascular:      Rate and Rhythm: Normal rate and regular rhythm.      Heart sounds: Normal heart sounds. No murmur heard.  Pulmonary:      Effort: Pulmonary effort is normal. No respiratory distress.      Breath sounds: Normal breath sounds. No stridor. No wheezing or rales.   Abdominal:      General: Bowel sounds are normal. There is no distension.      Palpations: Abdomen is soft. There is no mass.      Tenderness: There is no abdominal tenderness. There is no guarding or rebound.   Musculoskeletal:      Cervical back: Normal, normal range of motion and neck supple.      Thoracic back: Normal.      Lumbar back: Tenderness and bony tenderness present. Decreased range of motion.        Back:    Skin:     General: Skin is warm and dry.      Findings: No erythema or rash.   Neurological:      Mental Status: She is alert and oriented to person, place, and time.      Coordination: Coordination normal.   Psychiatric:         Behavior: Behavior normal.         Thought Content: Thought content normal.         Procedures           ED Course                   Labs Reviewed - No data to display    XR Spine Lumbar 2 or 3 View   Final Result   Overall mild degenerative changes along the lumbar   spine, as above. No malalignment.      Electronically signed by:  Yoel Pete MD  3/23/2022 9:15 AM CDT   Workstation: [id number]                                              MDM    Final diagnoses:   Strain of lumbar region, initial encounter       ED Disposition  ED Disposition     ED Disposition   Discharge    Condition   Stable    Comment   --             Trina Christy, APRN  [address] [place] [address] [place], [place] [address] [phone]    In 1 week           Medication List      New Prescriptions    cyclobenzaprine 10 MG tablet  Commonly known as: FLEXERIL  Take 1 tablet by mouth 3 (Three) Times a Day As Needed for Muscle Spasms.     HYDROcodone-acetaminophen 5-325 MG per tablet  Commonly known as: NORCO  Take 1 tablet by mouth Every 6 (Six) Hours As Needed for Moderate Pain .     meloxicam 15 MG tablet  Commonly known as: MOBIC  Take 1 tablet by mouth Daily.           Where to Get Your Medications      These medications were sent to [facility] - [place], [place] - [facility] [phone] [facility] [phone] [place] [id number]    Phone: [phone]   · cyclobenzaprine 10 MG tablet  · HYDROcodone-acetaminophen 5-325 MG per tablet  · meloxicam 15 MG tablet          Srinivasan Matt MD  03/23/22 5622

## 2022-03-23 NOTE — ED NOTES
Pt c/o of right side back, hip and right leg pain. Pt states that she was holding her son on Monday when she felt a pulling in her lowing back. Pt states yesterday it was sore but this morning it had become worse.

## 2022-11-11 ENCOUNTER — TRANSCRIBE ORDERS (OUTPATIENT)
Dept: GENERAL RADIOLOGY | Facility: CLINIC | Age: 47
End: 2022-11-11

## 2022-11-11 DIAGNOSIS — J45.20 ASTHMA, MILD INTERMITTENT, POORLY CONTROLLED: Primary | ICD-10-CM

## 2022-12-07 ENCOUNTER — OFFICE VISIT (OUTPATIENT)
Dept: OTOLARYNGOLOGY | Facility: CLINIC | Age: 47
End: 2022-12-07

## 2022-12-07 VITALS — HEART RATE: 104 BPM | BODY MASS INDEX: 27.51 KG/M2 | HEIGHT: 66 IN | OXYGEN SATURATION: 98 % | WEIGHT: 171.2 LBS

## 2022-12-07 DIAGNOSIS — G43.809 HEADACHE, VARIANT MIGRAINE: Primary | ICD-10-CM

## 2022-12-07 DIAGNOSIS — J30.1 SEASONAL ALLERGIC RHINITIS DUE TO POLLEN: ICD-10-CM

## 2022-12-07 PROCEDURE — 99203 OFFICE O/P NEW LOW 30 MIN: CPT | Performed by: OTOLARYNGOLOGY

## 2022-12-07 PROCEDURE — 31231 NASAL ENDOSCOPY DX: CPT | Performed by: OTOLARYNGOLOGY

## 2022-12-07 RX ORDER — SUMATRIPTAN 25 MG/1
TABLET, FILM COATED ORAL
Qty: 15 TABLET | Refills: 0 | Status: SHIPPED | OUTPATIENT
Start: 2022-12-07 | End: 2023-01-04 | Stop reason: SDUPTHER

## 2022-12-07 NOTE — PROGRESS NOTES
Chief complaint: Sinus problem    Assessment and Plan:  47F with severe Allergic rhinitis and variant migraines    --We did discuss that migraine headaches can present with symptoms easily confused with sinus disease including: facial pain or pressure, especially over the frontal and/or maxillary sinuses, nasal congestion, postnasal drip, rhinorrhea, and pressure behind the eyes.    -We also discussed that pain is generally not a symptom of acute or chronic sinusitis, outside of the setting of odontogenic sinusitis    -I recommend starting a food and environmental trigger diary to follow your symptoms, what you have eaten that day, and various weather and/or stress changes.  Try to remove all of the known triggers mentioned in the book, Heal your Headache, for 2-week.  And then add 1 thing back at a time, 1 week at a time and monitor your symptoms.  You may find 1 or more of these common triggers increases your symptoms.  Avoidance should help with decreasing how often you have the symptoms.    -Call when you believe you have a sinus infection and we will obtain a CT sinus without contrast to further evaluate for the presence of sinus disease    -Rx Imitrex abortive therapy for headaches    -Continue allergy medications as previously ordered. We did discuss that immunotherapy can take several months to take effect, but I suspect this will impact her symptoms most significantly    -FU 1 month to recheck    History of present illness:    Ms. Hughes is a 47-year-old female presenting today for evaluation of sinus pressure.  She states she has a lifelong history of severe allergies for which she is currently on nightly Zyrtec, nightly Singulair, twice daily Qvar and twice daily Flonase with minimal impact on her symptoms which seem to be worse during fall and winter.  She is also using saline irrigations at least once daily.  She does note that she just started subcutaneous immunotherapy last week, given her  persistent symptoms.  She notes that since August of this year starting with bad allergies at the beginning of harvest season she began experiencing intermittent severe facial pressure noted to be bifrontal and in between the eyes that waxes and wanes in severity, the last episode she has had is current and has been for the last 6 weeks or so this sometimes radiates into the bitemporal area and is characterized as a headache.  She states that sometimes when she develops this she also developed dizziness described as tingling in the hands and feet, tingling of the face and feeling like she is going to pass out without any feeling of vertigo.  She states when she sits down with this and goes into a quiet dark room this helps.  She does endorse photosensitivity during these episodes.  She has no personal history of migraine headaches and is not aware of a family history of migraine headaches.  She states that her smell and taste are intact.  No history of sinus surgery or nasal surgeries no history of nasal trauma.  No other acute ENT concerns today.    Vital Signs   Vitals:    12/07/22 0932   Pulse: 104   SpO2: 98%     Physical Exam:  General: NAD, awake and alert  Head: normocephalic, atraumatic  Eyes: EOMI, sclerae white, conjunctivae pink  Ears: pinnae intact without masses or lesions   Right: TM intact without injection or effusion   Left: TM intact without injection or effusion  Nose: external straight without deformity   Mucosa boggy and edematous. No polyps seen. No purulence.   Septum: midline   Turbinates: moderate to severe IT hypertrophy bilaterally  OC/OP: mucosa moist and pink, no masses or lesions, tongue is midline and mobile. Tonsils 2+ without exudate. Uvula single and elevates symmetrically.  Neck: supple, no masses or lesions. Thyroid without palpable masses.  Neuro: CN II - XII grossly intact, no focal deficits    Procedure: Rigid Nasal Endoscopy  Indications: Significant nasal obstruction, nasal  congestion, intermittent ear effusion  Anesthesia: Local  Surgeon: Jenny Khanna MD  Estimated Blood Loss: none  Complications: none    Description:  Rigid nasal endoscopy - 0 degree endoscope(s)  Informed consent was verbally obtained.  The nose was decongested with topical Neosynephrine and anesthetized with 4% lidocaine solution.  The endoscope was then placed into bilateral naris and advanced to the nasopharynx.  The endoscope was removed and advanced into the middle meatus. The endoscope was finally withdrawn at the conclusion of the exam.  Findings are listed below:    Nasal cavity:  No masses or lesions  Middle meatus: No polyps or pus.  Uncinate present. Ethmoids present.  Mucosa:  boggy and edematous mucosa throughout  Turbinates: Middle normal.  Inferior hypertrophied initially and decongests nicely   Septum: Midline and intact.  Nasopharynx: Normal adenoid pad and normal Eustachian tube orifices    Results Review:  Referring urgent care physician note from 12/2/22 reviewed today and demonstrates complaint of persistent sinus pressure and congestion located bifrontal and bimaxillary with associated nasal congestion and postnasal drainage intermittently present for several months.  Urgent care APRN visits from 10/26/2022, 6/15/2022, 5/9/2022, and 2/25/2021 demonstrate similar complaints without significant findings outside of nasal congestion plus or minus serous effusion of the ears  No cross-sectional imaging available to review today    Review of Systems:  Positive ROS items: Appetite change, unexpected 30 pound weight loss, congestion, ear pain, postnasal drip, sneezing, eye itching, cough, wheezing, shortness of breath, vomiting, neck pain, neck stiffness, dizziness, headaches, and weakness.  Otherwise, a 14 system ROS is negative except as pertinent positives are mentioned above.    Histories:  No Known Allergies    Prior to Admission medications    Medication Sig Start Date End Date Taking?  Authorizing Provider   diazePAM (VALIUM) 5 MG tablet Take 5 mg by mouth Every 12 (Twelve) Hours As Needed. for anxiety 2/20/22   Nurse Sheila Rios RN   doxycycline (MONODOX) 100 MG capsule Take 1 capsule by mouth 2 (Two) Times a Day. 12/2/22   Irwin Saha MD   EPINEPHrine (EPIPEN) 0.3 MG/0.3ML solution auto-injector injection  11/28/22   ProviderHammad MD   Esomeprazole Magnesium 20 MG tablet delayed-release     Nurse Sheila Rios RN   fexofenadine-pseudoephedrine (Allegra-D Allergy & Congestion)  MG per 12 hr tablet Take 1 tablet by mouth 2 (Two) Times a Day As Needed (congestion). 12/2/22   Irwin Saha MD   fluticasone (FLONASE) 50 MCG/ACT nasal spray 1 spray into the nostril(s) as directed by provider Daily.    Nurse Sheila Rios RN   ibuprofen (ADVIL,MOTRIN) 600 MG tablet Take 1 tablet by mouth Every 8 (Eight) Hours As Needed for Moderate Pain or Headache. 12/2/22   Irwin Saha MD   levocetirizine (XYZAL) 5 MG tablet Take 5 mg by mouth Every Evening.    Nurse Sheila Rios RN   levonorgestrel (MIRENA) 20 MCG/24HR IUD 1 each by Intrauterine route 1 (One) Time.    Nurse Sheila Rios RN   Loratadine 10 MG capsule Take 10 mg by mouth Daily.    Nurse Sheila Rios RN   Qvar RediHaler 40 MCG/ACT inhaler  11/21/22   Provider, MD Hammad       Past Medical History:   Diagnosis Date   • Acute maxillary sinusitis    • Acute otitis media    • Acute pharyngitis    • Acute sciatica    • Acute sciatica    • Acute serous otitis media    • Acute sinusitis    • Acute tonsillitis    • Allergic rhinitis    • Allergic rhinitis due to pollen    • Anxiety    • Atopy    • Backache    • Biliary calculus    • Depressive disorder    • Encounter for insertion of intrauterine contraceptive device    • Encounter for surveillance of intravaginal contraceptive    • Eustachian tube disorder    • Gastroesophageal reflux disease    • H/O breast implant    • Heartburn    • Irritability and  anger    • Low back pain    • Low grade squamous intraepithelial lesion (LGSIL) on cervicovaginal cytologic smear    • Malaise and fatigue    • Pain in throat    • Pregnancy examination or test, positive result    • Rhinitis    • Tonsillitis    • Upper respiratory infection        Past Surgical History:   Procedure Laterality Date   •  SECTION  2012    primary low transverse C section. Ovarian cystectomy.Intrauterine pregnancy at 38 weeks. Reactive nonstress test. Oligohydramnios. Trisomy 21. Fetal intolerance of labor   • CHOLECYSTECTOMY  2009    with operative cholangiogram. Gallstones   • COLPOSCOPY      low grade lesion, biopsy result was normal   • DENTAL PROCEDURE  10/12/1999    Surgical removal of teeth #17 & 32 with local anesthesia & IV sedation   • DILATATION AND CURETTAGE      missed    • INJECTION OF MEDICATION      Kenalog (3)   • INJECTION OF MEDICATION  2014    Toradol (1)   • INTRAUTERINE DEVICE INSERTION  2005    Mirena   • PAP SMEAR  10/31/2011    Epithelial cellabnormality; squamous cells.  Atypical squamous cells of undetermined significance   • VAGINAL DELIVERY      x3       Social History     Socioeconomic History   • Marital status:    Tobacco Use   • Smoking status: Former   • Smokeless tobacco: Never   Substance and Sexual Activity   • Alcohol use: Yes     Comment: social   • Drug use: No   • Sexual activity: Defer       Family History   Problem Relation Age of Onset   • Diabetes Other    • Heart disease Other    • Hyperlipidemia Other    • Hypertension Other    • Cholelithiasis Other    • Lung disease Other              This document has been electronically signed by Jenny Khanna MD on 2022 09:29 CST

## 2022-12-07 NOTE — PATIENT INSTRUCTIONS
"- Migraines commonly have triggers that can be traced back to particular foods (An example list can be found here: https://www.migrainestrong.com/minimizing-migraine-attacks-what-i-wish-i-knew/ ) . Consider starting a symptom diary in relation to the foods you eat. You can start by eliminating all commonly consumed trigger foods for two weeks, and then adding back one item at a time, one week at a time, while tracking your symptoms.    - Consider reading the book \"Heal Your Headache\" by Zackery Monteiro for better understanding and management of your headaches. This book is helpful for classic migraine headaches, as well as for management of atypical or variant migraines, such as those with ear or sinus symptoms.    -Call if you have significant symptoms and I will order a CT Sinus scan  "

## 2023-01-04 ENCOUNTER — OFFICE VISIT (OUTPATIENT)
Dept: OTOLARYNGOLOGY | Facility: CLINIC | Age: 48
End: 2023-01-04
Payer: COMMERCIAL

## 2023-01-04 VITALS — HEART RATE: 76 BPM | OXYGEN SATURATION: 98 % | WEIGHT: 175.6 LBS | HEIGHT: 66 IN | BODY MASS INDEX: 28.22 KG/M2

## 2023-01-04 DIAGNOSIS — G43.809 HEADACHE, VARIANT MIGRAINE: ICD-10-CM

## 2023-01-04 DIAGNOSIS — J30.89 SEASONAL ALLERGIC RHINITIS DUE TO OTHER ALLERGIC TRIGGER: Primary | ICD-10-CM

## 2023-01-04 PROCEDURE — 99214 OFFICE O/P EST MOD 30 MIN: CPT | Performed by: OTOLARYNGOLOGY

## 2023-01-04 RX ORDER — SUMATRIPTAN 25 MG/1
TABLET, FILM COATED ORAL
Qty: 15 TABLET | Refills: 0 | Status: SHIPPED | OUTPATIENT
Start: 2023-01-04

## 2023-01-04 NOTE — PROGRESS NOTES
Follow up Regarding: allergic rhinitis, variant migraine    Assessment and Plan:  47F with variant migraine, improved, allergic rhinitis, also improved    -Continue immunotherapy injections from your allergist  -Continue saline irrigations at least twice daily as well as your steroid inhaler  -Follow-up before ragweed season, in early August to recheck your symptoms  -Continue lamination diet for variant migraine headache, refill for Imitrex provided today    History of present illness/Interval history:    Ms. Hughes is a 47-year-old female presented today for reevaluation of her allergic rhinitis she is still on once weekly immunotherapy and feels that she may have slight improvement in her allergy symptoms including rhinorrhea, nasal congestion, postnasal drainage.  She does endorse upper respiratory tract infection over the holiday that acutely worsened her symptoms but this is resolving.  She continues to do saline irrigations at least twice daily as directed.  She states that the Imitrex has helped and feels her headaches are significantly improved.  She does endorse some ear popping without hearing loss, vertigo, drainage from the ears.  No other acute or new ENT concerns today.    Physical Exam:  General: NAD, awake and alert  Head: normocephalic, atraumatic  Eyes: EOMI, sclerae white, conjunctivae pink  Ears: pinnae intact without masses or lesions   Right: TM intact without injection or effusion   Left: TM intact without injection or effusion  Nose: external straight without deformity   Mucosa pink and irritated, moderately edematous. No polyps seen. No purulence.   Septum: midline   Turbinates: not hypertrophied  OC/OP: mucosa moist and pink, no masses or lesions, tongue is midline and mobile. Tonsils 2+ without exudate, cryptic. Uvula single and elevates symmetrically.  Neck: supple, no masses or lesions. Thyroid without palpable masses.  Neuro: CN II - XII grossly intact, no focal deficits    Vital  Signs   Vitals:    01/04/23 0817   Pulse: 76   SpO2: 98%     Results Review:  Previous clinic note from 12/7/2022 reviewed today and demonstrates variant migraine headache as well as severe seasonal allergic rhinitis recently started on SCIT in addition to symptom management medications.  Continued SCIT, allergy medications, and variant migraine headache advice provided.    Histories:  No Known Allergies    Prior to Admission medications    Medication Sig Start Date End Date Taking? Authorizing Provider   diazePAM (VALIUM) 5 MG tablet Take 5 mg by mouth Every 12 (Twelve) Hours As Needed. for anxiety 2/20/22   Nurse Sheila Rios RN   doxycycline (MONODOX) 100 MG capsule Take 1 capsule by mouth 2 (Two) Times a Day. 12/2/22   Irwin Saha MD   EPINEPHrine (EPIPEN) 0.3 MG/0.3ML solution auto-injector injection  11/28/22   ProviderHammad MD   Esomeprazole Magnesium 20 MG tablet delayed-release     Nurse Sheila Rios RN   fexofenadine-pseudoephedrine (Allegra-D Allergy & Congestion)  MG per 12 hr tablet Take 1 tablet by mouth 2 (Two) Times a Day As Needed (congestion). 12/2/22   Irwin Saha MD   fluticasone (FLONASE) 50 MCG/ACT nasal spray 1 spray into the nostril(s) as directed by provider Daily.    Nurse Sheila Rios RN   ibuprofen (ADVIL,MOTRIN) 600 MG tablet Take 1 tablet by mouth Every 8 (Eight) Hours As Needed for Moderate Pain or Headache. 12/2/22   Irwin Saha MD   levocetirizine (XYZAL) 5 MG tablet Take 5 mg by mouth Every Evening.    Nurse Sheila Rios RN   levonorgestrel (MIRENA) 20 MCG/24HR IUD 1 each by Intrauterine route 1 (One) Time.    Nurse Sheila Rios RN   Loratadine 10 MG capsule Take 10 mg by mouth Daily.    Nurse Sheila Rios RN   Qvar RediHaler 40 MCG/ACT inhaler  11/21/22   ProviderHammad MD   SUMAtriptan (Imitrex) 25 MG tablet Take one tablet at onset of headache. May repeat dose one time in 2 hours if headache not relieved. 12/7/22    Jenny Khanna MD       Past Medical History:   Diagnosis Date   • Acute maxillary sinusitis    • Acute otitis media    • Acute pharyngitis    • Acute sciatica    • Acute sciatica    • Acute serous otitis media    • Acute sinusitis    • Acute tonsillitis    • Allergic rhinitis    • Allergic rhinitis due to pollen    • Anxiety    • Atopy    • Backache    • Biliary calculus    • Depressive disorder    • Encounter for insertion of intrauterine contraceptive device    • Encounter for surveillance of intravaginal contraceptive    • Eustachian tube disorder    • Gastroesophageal reflux disease    • H/O breast implant    • Heartburn    • Irritability and anger    • Low back pain    • Low grade squamous intraepithelial lesion (LGSIL) on cervicovaginal cytologic smear    • Malaise and fatigue    • Pain in throat    • Pregnancy examination or test, positive result    • Rhinitis    • Tonsillitis    • Upper respiratory infection        Past Surgical History:   Procedure Laterality Date   •  SECTION  2012    primary low transverse C section. Ovarian cystectomy.Intrauterine pregnancy at 38 weeks. Reactive nonstress test. Oligohydramnios. Trisomy 21. Fetal intolerance of labor   • CHOLECYSTECTOMY  2009    with operative cholangiogram. Gallstones   • COLPOSCOPY      low grade lesion, biopsy result was normal   • DENTAL PROCEDURE  10/12/1999    Surgical removal of teeth #17 & 32 with local anesthesia & IV sedation   • DILATATION AND CURETTAGE  1995    missed    • INJECTION OF MEDICATION      Kenalog (3)   • INJECTION OF MEDICATION  2014    Toradol (1)   • INTRAUTERINE DEVICE INSERTION  2005    Mirena   • PAP SMEAR  10/31/2011    Epithelial cellabnormality; squamous cells.  Atypical squamous cells of undetermined significance   • VAGINAL DELIVERY      x3       Social History     Socioeconomic History   • Marital status:    Tobacco Use   • Smoking status: Former   • Smokeless tobacco:  Never   Substance and Sexual Activity   • Alcohol use: Yes     Comment: social   • Drug use: No   • Sexual activity: Defer       Family History   Problem Relation Age of Onset   • Diabetes Other    • Heart disease Other    • Hyperlipidemia Other    • Hypertension Other    • Cholelithiasis Other    • Lung disease Other          This document has been electronically signed by Jenny Khanna MD on January 4, 2023 08:05 CST

## 2023-02-03 ENCOUNTER — APPOINTMENT (OUTPATIENT)
Dept: GENERAL RADIOLOGY | Facility: HOSPITAL | Age: 48
End: 2023-02-03
Payer: COMMERCIAL

## 2023-02-03 ENCOUNTER — HOSPITAL ENCOUNTER (EMERGENCY)
Facility: HOSPITAL | Age: 48
Discharge: HOME | End: 2023-02-03
Attending: STUDENT IN AN ORGANIZED HEALTH CARE EDUCATION/TRAINING PROGRAM | Admitting: EMERGENCY MEDICINE
Payer: COMMERCIAL

## 2023-02-03 VITALS
SYSTOLIC BLOOD PRESSURE: 134 MMHG | TEMPERATURE: 98.3 F | DIASTOLIC BLOOD PRESSURE: 74 MMHG | WEIGHT: 170 LBS | HEIGHT: 66 IN | RESPIRATION RATE: 18 BRPM | HEART RATE: 100 BPM | BODY MASS INDEX: 27.32 KG/M2 | OXYGEN SATURATION: 96 %

## 2023-02-03 DIAGNOSIS — R07.9 CHEST PAIN, UNSPECIFIED TYPE: Primary | ICD-10-CM

## 2023-02-03 LAB
ALBUMIN SERPL-MCNC: 4.5 G/DL (ref 3.5–5.2)
ALBUMIN/GLOB SERPL: 1.5 G/DL
ALP SERPL-CCNC: 71 U/L (ref 39–117)
ALT SERPL W P-5'-P-CCNC: 14 U/L (ref 1–33)
ANION GAP SERPL CALCULATED.3IONS-SCNC: 8 MMOL/L (ref 5–15)
AST SERPL-CCNC: 16 U/L (ref 1–32)
BASOPHILS # BLD AUTO: 0.04 10*3/MM3 (ref 0–0.2)
BASOPHILS NFR BLD AUTO: 0.3 % (ref 0–1.5)
BILIRUB SERPL-MCNC: 0.6 MG/DL (ref 0–1.2)
BUN SERPL-MCNC: 8 MG/DL (ref 6–20)
BUN/CREAT SERPL: 10.4 (ref 7–25)
CALCIUM SPEC-SCNC: 9.6 MG/DL (ref 8.6–10.5)
CHLORIDE SERPL-SCNC: 99 MMOL/L (ref 98–107)
CO2 SERPL-SCNC: 25 MMOL/L (ref 22–29)
CREAT SERPL-MCNC: 0.77 MG/DL (ref 0.57–1)
DEPRECATED RDW RBC AUTO: 39.7 FL (ref 37–54)
EGFRCR SERPLBLD CKD-EPI 2021: 95.3 ML/MIN/1.73
EOSINOPHIL # BLD AUTO: 0.01 10*3/MM3 (ref 0–0.4)
EOSINOPHIL NFR BLD AUTO: 0.1 % (ref 0.3–6.2)
ERYTHROCYTE [DISTWIDTH] IN BLOOD BY AUTOMATED COUNT: 12.8 % (ref 12.3–15.4)
GLOBULIN UR ELPH-MCNC: 3 GM/DL
GLUCOSE SERPL-MCNC: 117 MG/DL (ref 65–99)
HCT VFR BLD AUTO: 43.7 % (ref 34–46.6)
HGB BLD-MCNC: 14.6 G/DL (ref 12–15.9)
HOLD SPECIMEN: NORMAL
HOLD SPECIMEN: NORMAL
IMM GRANULOCYTES # BLD AUTO: 0.05 10*3/MM3 (ref 0–0.05)
IMM GRANULOCYTES NFR BLD AUTO: 0.4 % (ref 0–0.5)
LYMPHOCYTES # BLD AUTO: 1.06 10*3/MM3 (ref 0.7–3.1)
LYMPHOCYTES NFR BLD AUTO: 8.3 % (ref 19.6–45.3)
MCH RBC QN AUTO: 28.5 PG (ref 26.6–33)
MCHC RBC AUTO-ENTMCNC: 33.4 G/DL (ref 31.5–35.7)
MCV RBC AUTO: 85.4 FL (ref 79–97)
MONOCYTES # BLD AUTO: 0.82 10*3/MM3 (ref 0.1–0.9)
MONOCYTES NFR BLD AUTO: 6.4 % (ref 5–12)
NEUTROPHILS NFR BLD AUTO: 10.85 10*3/MM3 (ref 1.7–7)
NEUTROPHILS NFR BLD AUTO: 84.5 % (ref 42.7–76)
NRBC BLD AUTO-RTO: 0 /100 WBC (ref 0–0.2)
NT-PROBNP SERPL-MCNC: 59.8 PG/ML (ref 0–450)
PLATELET # BLD AUTO: 255 10*3/MM3 (ref 140–450)
PMV BLD AUTO: 10.4 FL (ref 6–12)
POTASSIUM SERPL-SCNC: 3.7 MMOL/L (ref 3.5–5.2)
PROT SERPL-MCNC: 7.5 G/DL (ref 6–8.5)
QT INTERVAL: 322 MS
QTC INTERVAL: 421 MS
RBC # BLD AUTO: 5.12 10*6/MM3 (ref 3.77–5.28)
SODIUM SERPL-SCNC: 132 MMOL/L (ref 136–145)
TROPONIN T SERPL-MCNC: <0.01 NG/ML (ref 0–0.03)
TROPONIN T SERPL-MCNC: <0.01 NG/ML (ref 0–0.03)
WBC NRBC COR # BLD: 12.83 10*3/MM3 (ref 3.4–10.8)
WHOLE BLOOD HOLD COAG: NORMAL
WHOLE BLOOD HOLD SPECIMEN: NORMAL

## 2023-02-03 PROCEDURE — 93005 ELECTROCARDIOGRAM TRACING: CPT | Performed by: STUDENT IN AN ORGANIZED HEALTH CARE EDUCATION/TRAINING PROGRAM

## 2023-02-03 PROCEDURE — 84484 ASSAY OF TROPONIN QUANT: CPT | Performed by: STUDENT IN AN ORGANIZED HEALTH CARE EDUCATION/TRAINING PROGRAM

## 2023-02-03 PROCEDURE — 83880 ASSAY OF NATRIURETIC PEPTIDE: CPT | Performed by: STUDENT IN AN ORGANIZED HEALTH CARE EDUCATION/TRAINING PROGRAM

## 2023-02-03 PROCEDURE — 71045 X-RAY EXAM CHEST 1 VIEW: CPT

## 2023-02-03 PROCEDURE — 93005 ELECTROCARDIOGRAM TRACING: CPT

## 2023-02-03 PROCEDURE — 99284 EMERGENCY DEPT VISIT MOD MDM: CPT

## 2023-02-03 PROCEDURE — 93010 ELECTROCARDIOGRAM REPORT: CPT | Performed by: INTERNAL MEDICINE

## 2023-02-03 PROCEDURE — 25010000002 METOCLOPRAMIDE PER 10 MG: Performed by: STUDENT IN AN ORGANIZED HEALTH CARE EDUCATION/TRAINING PROGRAM

## 2023-02-03 PROCEDURE — 36415 COLL VENOUS BLD VENIPUNCTURE: CPT

## 2023-02-03 PROCEDURE — 80053 COMPREHEN METABOLIC PANEL: CPT | Performed by: STUDENT IN AN ORGANIZED HEALTH CARE EDUCATION/TRAINING PROGRAM

## 2023-02-03 PROCEDURE — 25010000002 KETOROLAC TROMETHAMINE PER 15 MG: Performed by: STUDENT IN AN ORGANIZED HEALTH CARE EDUCATION/TRAINING PROGRAM

## 2023-02-03 PROCEDURE — 96375 TX/PRO/DX INJ NEW DRUG ADDON: CPT

## 2023-02-03 PROCEDURE — 85025 COMPLETE CBC W/AUTO DIFF WBC: CPT | Performed by: STUDENT IN AN ORGANIZED HEALTH CARE EDUCATION/TRAINING PROGRAM

## 2023-02-03 PROCEDURE — 96374 THER/PROPH/DIAG INJ IV PUSH: CPT

## 2023-02-03 RX ORDER — METOCLOPRAMIDE HYDROCHLORIDE 5 MG/ML
10 INJECTION INTRAMUSCULAR; INTRAVENOUS ONCE
Status: COMPLETED | OUTPATIENT
Start: 2023-02-03 | End: 2023-02-03

## 2023-02-03 RX ORDER — NITROGLYCERIN 0.4 MG/1
0.4 TABLET SUBLINGUAL
Status: DISCONTINUED | OUTPATIENT
Start: 2023-02-03 | End: 2023-02-03 | Stop reason: HOSPADM

## 2023-02-03 RX ORDER — ASPIRIN 81 MG/1
324 TABLET, CHEWABLE ORAL ONCE
Status: DISCONTINUED | OUTPATIENT
Start: 2023-02-03 | End: 2023-02-03 | Stop reason: HOSPADM

## 2023-02-03 RX ORDER — SODIUM CHLORIDE 0.9 % (FLUSH) 0.9 %
10 SYRINGE (ML) INJECTION AS NEEDED
Status: DISCONTINUED | OUTPATIENT
Start: 2023-02-03 | End: 2023-02-03 | Stop reason: HOSPADM

## 2023-02-03 RX ORDER — KETOROLAC TROMETHAMINE 15 MG/ML
15 INJECTION, SOLUTION INTRAMUSCULAR; INTRAVENOUS ONCE
Status: COMPLETED | OUTPATIENT
Start: 2023-02-03 | End: 2023-02-03

## 2023-02-03 RX ADMIN — SODIUM CHLORIDE, POTASSIUM CHLORIDE, SODIUM LACTATE AND CALCIUM CHLORIDE 1000 ML: 600; 310; 30; 20 INJECTION, SOLUTION INTRAVENOUS at 15:10

## 2023-02-03 RX ADMIN — KETOROLAC TROMETHAMINE 15 MG: 15 INJECTION, SOLUTION INTRAMUSCULAR; INTRAVENOUS at 15:10

## 2023-02-03 RX ADMIN — METOCLOPRAMIDE 10 MG: 5 INJECTION, SOLUTION INTRAMUSCULAR; INTRAVENOUS at 15:11

## 2023-02-03 NOTE — ED PROVIDER NOTES
Subjective   History of Present Illness  47-year-old female comes to the ER chief complaint of substernal chest pressure that does not radiate anywhere since earlier this morning.  Nothing makes it better or worse.  Pain is constant.  She denies having cardiac history.  She has never had pain like this before.  She denies other symptoms.    History provided by:  Patient   used: No        Review of Systems   Constitutional: Negative for activity change, appetite change, chills and fever.   HENT: Negative for drooling.    Eyes: Negative for photophobia, redness and visual disturbance.   Respiratory: Negative for cough, chest tightness, shortness of breath and wheezing.    Cardiovascular: Positive for chest pain. Negative for palpitations.   Gastrointestinal: Negative for abdominal pain, constipation, diarrhea, nausea and vomiting.   Genitourinary: Negative for flank pain.   Skin: Negative for color change.   Neurological: Positive for headaches. Negative for dizziness, seizures, weakness, light-headedness and numbness.   Psychiatric/Behavioral: Negative for confusion.       Past Medical History:   Diagnosis Date   • Acute maxillary sinusitis    • Acute otitis media    • Acute pharyngitis    • Acute sciatica    • Acute sciatica    • Acute serous otitis media    • Acute sinusitis    • Acute tonsillitis    • Allergic rhinitis    • Allergic rhinitis due to pollen    • Anxiety    • Atopy    • Backache    • Biliary calculus    • Depressive disorder    • Encounter for insertion of intrauterine contraceptive device    • Encounter for surveillance of intravaginal contraceptive    • Eustachian tube disorder    • Gastroesophageal reflux disease    • H/O breast implant    • Heartburn    • Irritability and anger    • Low back pain    • Low grade squamous intraepithelial lesion (LGSIL) on cervicovaginal cytologic smear    • Malaise and fatigue    • Pain in throat    • Pregnancy examination or test, positive  "result    • Rhinitis    • Tonsillitis    • Upper respiratory infection        No Known Allergies    Past Surgical History:   Procedure Laterality Date   •  SECTION  2012    primary low transverse C section. Ovarian cystectomy.Intrauterine pregnancy at 38 weeks. Reactive nonstress test. Oligohydramnios. Trisomy 21. Fetal intolerance of labor   • CHOLECYSTECTOMY  2009    with operative cholangiogram. Gallstones   • COLPOSCOPY      low grade lesion, biopsy result was normal   • DENTAL PROCEDURE  10/12/1999    Surgical removal of teeth #17 & 32 with local anesthesia & IV sedation   • DILATATION AND CURETTAGE      missed    • INJECTION OF MEDICATION      Kenalog (3)   • INJECTION OF MEDICATION  2014    Toradol (1)   • INTRAUTERINE DEVICE INSERTION  2005    Mirena   • PAP SMEAR  10/31/2011    Epithelial cellabnormality; squamous cells.  Atypical squamous cells of undetermined significance   • VAGINAL DELIVERY      x3       Family History   Problem Relation Age of Onset   • Diabetes Other    • Heart disease Other    • Hyperlipidemia Other    • Hypertension Other    • Cholelithiasis Other    • Lung disease Other        Social History     Socioeconomic History   • Marital status:    Tobacco Use   • Smoking status: Former   • Smokeless tobacco: Never   Substance and Sexual Activity   • Alcohol use: Yes     Comment: social   • Drug use: No   • Sexual activity: Defer           Objective    Vitals:    23 1439 23 1651 23 1800 23 1900   BP: (!) 153/105 118/68 111/59 134/74   BP Location: Left arm Right arm  Left arm   Patient Position: Sitting Lying     Pulse: 105 94 86 100   Resp: 22 20 19 18   Temp: 98.3 °F (36.8 °C)      TempSrc: Oral      SpO2: 99% 99% 99% 96%   Weight: 77.1 kg (170 lb)      Height: 167.6 cm (66\")          Physical Exam  Vitals and nursing note reviewed.   Constitutional:       General: She is not in acute distress.     Appearance: " She is well-developed. She is ill-appearing. She is not toxic-appearing or diaphoretic.   Eyes:      Conjunctiva/sclera: Conjunctivae normal.   Pulmonary:      Effort: Pulmonary effort is normal. No accessory muscle usage or respiratory distress.   Chest:      Chest wall: No tenderness.   Abdominal:      Palpations: Abdomen is soft.      Tenderness: There is no abdominal tenderness (deep palpation).   Skin:     General: Skin is warm and dry.      Capillary Refill: Capillary refill takes less than 2 seconds.   Neurological:      Mental Status: She is alert and oriented to person, place, and time.         Procedures           ED Course  ED Course as of 02/04/23 0330   Fri Feb 03, 2023   1820 Patient checked out to Dr. Brooks. []      ED Course User Index  [] Miko Darnell MD      Results for orders placed or performed during the hospital encounter of 02/03/23   Troponin    Specimen: Blood   Result Value Ref Range    Troponin T <0.010 0.000 - 0.030 ng/mL   Troponin    Specimen: Blood   Result Value Ref Range    Troponin T <0.010 0.000 - 0.030 ng/mL   Comprehensive Metabolic Panel    Specimen: Blood   Result Value Ref Range    Glucose 117 (H) 65 - 99 mg/dL    BUN 8 6 - 20 mg/dL    Creatinine 0.77 0.57 - 1.00 mg/dL    Sodium 132 (L) 136 - 145 mmol/L    Potassium 3.7 3.5 - 5.2 mmol/L    Chloride 99 98 - 107 mmol/L    CO2 25.0 22.0 - 29.0 mmol/L    Calcium 9.6 8.6 - 10.5 mg/dL    Total Protein 7.5 6.0 - 8.5 g/dL    Albumin 4.5 3.5 - 5.2 g/dL    ALT (SGPT) 14 1 - 33 U/L    AST (SGOT) 16 1 - 32 U/L    Alkaline Phosphatase 71 39 - 117 U/L    Total Bilirubin 0.6 0.0 - 1.2 mg/dL    Globulin 3.0 gm/dL    A/G Ratio 1.5 g/dL    BUN/Creatinine Ratio 10.4 7.0 - 25.0    Anion Gap 8.0 5.0 - 15.0 mmol/L    eGFR 95.3 >60.0 mL/min/1.73   BNP    Specimen: Blood   Result Value Ref Range    proBNP 59.8 0.0 - 450.0 pg/mL   CBC Auto Differential    Specimen: Blood   Result Value Ref Range    WBC 12.83 (H) 3.40 - 10.80 10*3/mm3    RBC  "5.12 3.77 - 5.28 10*6/mm3    Hemoglobin 14.6 12.0 - 15.9 g/dL    Hematocrit 43.7 34.0 - 46.6 %    MCV 85.4 79.0 - 97.0 fL    MCH 28.5 26.6 - 33.0 pg    MCHC 33.4 31.5 - 35.7 g/dL    RDW 12.8 12.3 - 15.4 %    RDW-SD 39.7 37.0 - 54.0 fl    MPV 10.4 6.0 - 12.0 fL    Platelets 255 140 - 450 10*3/mm3    Neutrophil % 84.5 (H) 42.7 - 76.0 %    Lymphocyte % 8.3 (L) 19.6 - 45.3 %    Monocyte % 6.4 5.0 - 12.0 %    Eosinophil % 0.1 (L) 0.3 - 6.2 %    Basophil % 0.3 0.0 - 1.5 %    Immature Grans % 0.4 0.0 - 0.5 %    Neutrophils, Absolute 10.85 (H) 1.70 - 7.00 10*3/mm3    Lymphocytes, Absolute 1.06 0.70 - 3.10 10*3/mm3    Monocytes, Absolute 0.82 0.10 - 0.90 10*3/mm3    Eosinophils, Absolute 0.01 0.00 - 0.40 10*3/mm3    Basophils, Absolute 0.04 0.00 - 0.20 10*3/mm3    Immature Grans, Absolute 0.05 0.00 - 0.05 10*3/mm3    nRBC 0.0 0.0 - 0.2 /100 WBC   ECG 12 Lead Chest Pain   Result Value Ref Range    QT Interval 322 ms    QTC Interval 421 ms   Green Top (Gel)   Result Value Ref Range    Extra Tube Hold for add-ons.    Lavender Top   Result Value Ref Range    Extra Tube hold for add-on    Gold Top - SST   Result Value Ref Range    Extra Tube Hold for add-ons.    Light Blue Top   Result Value Ref Range    Extra Tube Hold for add-ons.      XR Chest 1 View   Final Result   CONCLUSION:   No Acute Disease      [id number]      Electronically signed by:  Familia Olivera MD  2/3/2023 4:33 PM CST   Workstation: [id number]                Medical Decision Making  Case accepted from Dr. Darnell in signout.  The patient is here with chest pain and has a low heart score.  We are awaiting a second troponin as part of a cardiac rule out process.    The patient's recent past medical charts for this facility as well as outside facilities via the \"care everywhere\" application of epic reviewed.    The differential diagnosis includes acute coronary syndrome, anxiety reaction, pneumonia, and pneumothorax, among others.    On final reevaluation the patient was in " stable condition with improved vital signs and reporting no pain.  She is agreeable to follow-up and we discussed discharge instructions and reasons for early return to the emergency department.      Chest pain, unspecified type: acute illness or injury  Amount and/or Complexity of Data Reviewed  Labs: ordered. Decision-making details documented in ED Course.  Radiology: ordered.  ECG/medicine tests: ordered and independent interpretation performed.      Risk  OTC drugs.  Prescription drug management.          Final diagnoses:   Chest pain, unspecified type       ED Disposition  ED Disposition     ED Disposition   Discharge    Condition   Stable    Comment   --             Bassam Diego MD  [address] [place] [address] [address]  [facility] [address] [phone]    Call in 2 days  To make an appointment to be reevaluated for your chest pain after an emergency department visit.    [facility] EMERGENCY DEPARTMENT  [address] [facility] [address] [phone]    As needed, If symptoms worsen at any time         Medication List      Stop    diazePAM 5 MG tablet  Commonly known as: VALIUM     doxycycline 100 MG capsule  Commonly known as: MONODOX     Esomeprazole Magnesium 20 MG tablet delayed-release     ibuprofen 600 MG tablet  Commonly known as: INGA CONNOLLY John N, DO  02/04/23 0330

## 2023-05-03 DIAGNOSIS — G43.809 HEADACHE, VARIANT MIGRAINE: ICD-10-CM

## 2023-05-03 RX ORDER — SUMATRIPTAN 25 MG/1
TABLET, FILM COATED ORAL
Qty: 15 TABLET | Refills: 0 | Status: SHIPPED | OUTPATIENT
Start: 2023-05-03

## 2023-06-03 ENCOUNTER — APPOINTMENT (OUTPATIENT)
Dept: GENERAL RADIOLOGY | Facility: HOSPITAL | Age: 48
End: 2023-06-03
Payer: COMMERCIAL

## 2023-06-03 ENCOUNTER — HOSPITAL ENCOUNTER (EMERGENCY)
Facility: HOSPITAL | Age: 48
Discharge: HOME | End: 2023-06-03
Attending: FAMILY MEDICINE
Payer: COMMERCIAL

## 2023-06-03 VITALS
SYSTOLIC BLOOD PRESSURE: 130 MMHG | WEIGHT: 172.2 LBS | OXYGEN SATURATION: 99 % | HEIGHT: 66 IN | DIASTOLIC BLOOD PRESSURE: 82 MMHG | RESPIRATION RATE: 20 BRPM | BODY MASS INDEX: 27.68 KG/M2 | HEART RATE: 66 BPM | TEMPERATURE: 98 F

## 2023-06-03 DIAGNOSIS — F43.0 PANIC ATTACK AS REACTION TO STRESS: ICD-10-CM

## 2023-06-03 DIAGNOSIS — F41.0 PANIC ATTACK AS REACTION TO STRESS: ICD-10-CM

## 2023-06-03 DIAGNOSIS — F43.0 STRESS REACTION: Primary | ICD-10-CM

## 2023-06-03 LAB
ALBUMIN SERPL-MCNC: 4.4 G/DL (ref 3.5–5.2)
ALBUMIN/GLOB SERPL: 1.8 G/DL
ALP SERPL-CCNC: 55 U/L (ref 39–117)
ALT SERPL W P-5'-P-CCNC: 14 U/L (ref 1–33)
AMPHET+METHAMPHET UR QL: NEGATIVE
AMPHETAMINES UR QL: NEGATIVE
ANION GAP SERPL CALCULATED.3IONS-SCNC: 11 MMOL/L (ref 5–15)
APAP SERPL-MCNC: 11.6 MCG/ML (ref 0–30)
AST SERPL-CCNC: 13 U/L (ref 1–32)
BARBITURATES UR QL SCN: NEGATIVE
BASOPHILS # BLD AUTO: 0.04 10*3/MM3 (ref 0–0.2)
BASOPHILS NFR BLD AUTO: 0.4 % (ref 0–1.5)
BENZODIAZ UR QL SCN: NEGATIVE
BILIRUB SERPL-MCNC: 0.3 MG/DL (ref 0–1.2)
BUN SERPL-MCNC: 8 MG/DL (ref 6–20)
BUN/CREAT SERPL: 11.6 (ref 7–25)
BUPRENORPHINE SERPL-MCNC: NEGATIVE NG/ML
CALCIUM SPEC-SCNC: 9.4 MG/DL (ref 8.6–10.5)
CANNABINOIDS SERPL QL: POSITIVE
CHLORIDE SERPL-SCNC: 102 MMOL/L (ref 98–107)
CO2 SERPL-SCNC: 24 MMOL/L (ref 22–29)
COCAINE UR QL: NEGATIVE
CREAT SERPL-MCNC: 0.69 MG/DL (ref 0.57–1)
DEPRECATED RDW RBC AUTO: 39.8 FL (ref 37–54)
EGFRCR SERPLBLD CKD-EPI 2021: 106.5 ML/MIN/1.73
EOSINOPHIL # BLD AUTO: 0.02 10*3/MM3 (ref 0–0.4)
EOSINOPHIL NFR BLD AUTO: 0.2 % (ref 0.3–6.2)
ERYTHROCYTE [DISTWIDTH] IN BLOOD BY AUTOMATED COUNT: 12.8 % (ref 12.3–15.4)
ETHANOL BLD-MCNC: <10 MG/DL (ref 0–10)
ETHANOL UR QL: <0.01 %
FENTANYL UR-MCNC: NEGATIVE NG/ML
GEN 5 2HR TROPONIN T REFLEX: <6 NG/L
GLOBULIN UR ELPH-MCNC: 2.5 GM/DL
GLUCOSE BLDC GLUCOMTR-MCNC: 104 MG/DL (ref 70–130)
GLUCOSE SERPL-MCNC: 109 MG/DL (ref 65–99)
HCT VFR BLD AUTO: 43.3 % (ref 34–46.6)
HGB BLD-MCNC: 14.5 G/DL (ref 12–15.9)
HOLD SPECIMEN: NORMAL
HOLD SPECIMEN: NORMAL
IMM GRANULOCYTES # BLD AUTO: 0.03 10*3/MM3 (ref 0–0.05)
IMM GRANULOCYTES NFR BLD AUTO: 0.3 % (ref 0–0.5)
LYMPHOCYTES # BLD AUTO: 2.37 10*3/MM3 (ref 0.7–3.1)
LYMPHOCYTES NFR BLD AUTO: 23.3 % (ref 19.6–45.3)
MCH RBC QN AUTO: 28.5 PG (ref 26.6–33)
MCHC RBC AUTO-ENTMCNC: 33.5 G/DL (ref 31.5–35.7)
MCV RBC AUTO: 85.2 FL (ref 79–97)
METHADONE UR QL SCN: NEGATIVE
MONOCYTES # BLD AUTO: 0.67 10*3/MM3 (ref 0.1–0.9)
MONOCYTES NFR BLD AUTO: 6.6 % (ref 5–12)
NEUTROPHILS NFR BLD AUTO: 69.2 % (ref 42.7–76)
NEUTROPHILS NFR BLD AUTO: 7.03 10*3/MM3 (ref 1.7–7)
NRBC BLD AUTO-RTO: 0 /100 WBC (ref 0–0.2)
NT-PROBNP SERPL-MCNC: <36 PG/ML (ref 0–450)
OPIATES UR QL: NEGATIVE
OXYCODONE UR QL SCN: NEGATIVE
PCP UR QL SCN: NEGATIVE
PLATELET # BLD AUTO: 285 10*3/MM3 (ref 140–450)
PMV BLD AUTO: 10.3 FL (ref 6–12)
POTASSIUM SERPL-SCNC: 3.9 MMOL/L (ref 3.5–5.2)
PROPOXYPH UR QL: NEGATIVE
PROT SERPL-MCNC: 6.9 G/DL (ref 6–8.5)
RBC # BLD AUTO: 5.08 10*6/MM3 (ref 3.77–5.28)
SALICYLATES SERPL-MCNC: 1.9 MG/DL
SODIUM SERPL-SCNC: 137 MMOL/L (ref 136–145)
TRICYCLICS UR QL SCN: NEGATIVE
TROPONIN T DELTA: NORMAL
TROPONIN T SERPL HS-MCNC: <6 NG/L
WBC NRBC COR # BLD: 10.16 10*3/MM3 (ref 3.4–10.8)
WHOLE BLOOD HOLD COAG: NORMAL
WHOLE BLOOD HOLD SPECIMEN: NORMAL

## 2023-06-03 PROCEDURE — 82077 ASSAY SPEC XCP UR&BREATH IA: CPT | Performed by: EMERGENCY MEDICINE

## 2023-06-03 PROCEDURE — 80179 DRUG ASSAY SALICYLATE: CPT | Performed by: EMERGENCY MEDICINE

## 2023-06-03 PROCEDURE — 84484 ASSAY OF TROPONIN QUANT: CPT

## 2023-06-03 PROCEDURE — 80053 COMPREHEN METABOLIC PANEL: CPT

## 2023-06-03 PROCEDURE — 82948 REAGENT STRIP/BLOOD GLUCOSE: CPT

## 2023-06-03 PROCEDURE — 71045 X-RAY EXAM CHEST 1 VIEW: CPT

## 2023-06-03 PROCEDURE — 83880 ASSAY OF NATRIURETIC PEPTIDE: CPT

## 2023-06-03 PROCEDURE — 80307 DRUG TEST PRSMV CHEM ANLYZR: CPT | Performed by: EMERGENCY MEDICINE

## 2023-06-03 PROCEDURE — 85025 COMPLETE CBC W/AUTO DIFF WBC: CPT

## 2023-06-03 PROCEDURE — 99283 EMERGENCY DEPT VISIT LOW MDM: CPT

## 2023-06-03 PROCEDURE — 80143 DRUG ASSAY ACETAMINOPHEN: CPT | Performed by: EMERGENCY MEDICINE

## 2023-06-03 PROCEDURE — 93005 ELECTROCARDIOGRAM TRACING: CPT | Performed by: EMERGENCY MEDICINE

## 2023-06-03 RX ORDER — HYDROXYZINE HYDROCHLORIDE 25 MG/1
25 TABLET, FILM COATED ORAL EVERY 6 HOURS PRN
Qty: 30 TABLET | Refills: 0 | Status: SHIPPED | OUTPATIENT
Start: 2023-06-03 | End: 2023-06-09

## 2023-06-03 RX ORDER — HYDROXYZINE HYDROCHLORIDE 25 MG/1
25 TABLET, FILM COATED ORAL EVERY 6 HOURS PRN
Qty: 30 TABLET | Refills: 0 | Status: SHIPPED | OUTPATIENT
Start: 2023-06-03 | End: 2023-06-03 | Stop reason: SDUPTHER

## 2023-06-03 RX ORDER — SODIUM CHLORIDE 0.9 % (FLUSH) 0.9 %
10 SYRINGE (ML) INJECTION AS NEEDED
Status: DISCONTINUED | OUTPATIENT
Start: 2023-06-03 | End: 2023-06-03 | Stop reason: HOSPADM

## 2023-06-03 NOTE — DISCHARGE INSTRUCTIONS
Followup with Dr. Brush to establish a primary care provider.  Hydroxyzine only as needed for anxiety and panic.  Return with any new or worsening symptoms, or any concerns.
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
## 2023-06-04 LAB
QT INTERVAL: 372 MS
QTC INTERVAL: 398 MS

## 2023-06-09 ENCOUNTER — LAB (OUTPATIENT)
Dept: LAB | Facility: HOSPITAL | Age: 48
End: 2023-06-09
Payer: COMMERCIAL

## 2023-06-09 ENCOUNTER — OFFICE VISIT (OUTPATIENT)
Dept: FAMILY MEDICINE CLINIC | Facility: CLINIC | Age: 48
End: 2023-06-09
Payer: COMMERCIAL

## 2023-06-09 VITALS
HEART RATE: 79 BPM | RESPIRATION RATE: 18 BRPM | WEIGHT: 174.5 LBS | DIASTOLIC BLOOD PRESSURE: 80 MMHG | HEIGHT: 66 IN | SYSTOLIC BLOOD PRESSURE: 140 MMHG | OXYGEN SATURATION: 98 % | BODY MASS INDEX: 28.04 KG/M2 | TEMPERATURE: 98.5 F

## 2023-06-09 DIAGNOSIS — Z11.59 NEED FOR HEPATITIS C SCREENING TEST: ICD-10-CM

## 2023-06-09 DIAGNOSIS — J30.2 SEASONAL ALLERGIES: ICD-10-CM

## 2023-06-09 DIAGNOSIS — F33.0 MAJOR DEPRESSIVE DISORDER, RECURRENT, MILD: ICD-10-CM

## 2023-06-09 DIAGNOSIS — F41.9 ANXIETY: ICD-10-CM

## 2023-06-09 DIAGNOSIS — G89.29 CHRONIC NONINTRACTABLE HEADACHE, UNSPECIFIED HEADACHE TYPE: ICD-10-CM

## 2023-06-09 DIAGNOSIS — Z00.00 ANNUAL PHYSICAL EXAM: ICD-10-CM

## 2023-06-09 DIAGNOSIS — R51.9 CHRONIC NONINTRACTABLE HEADACHE, UNSPECIFIED HEADACHE TYPE: ICD-10-CM

## 2023-06-09 DIAGNOSIS — Z76.89 ENCOUNTER TO ESTABLISH CARE: Primary | ICD-10-CM

## 2023-06-09 LAB
ALBUMIN SERPL-MCNC: 4.5 G/DL (ref 3.5–5.2)
ALBUMIN/GLOB SERPL: 1.9 G/DL
ALP SERPL-CCNC: 56 U/L (ref 39–117)
ALT SERPL W P-5'-P-CCNC: 17 U/L (ref 1–33)
ANION GAP SERPL CALCULATED.3IONS-SCNC: 9 MMOL/L (ref 5–15)
AST SERPL-CCNC: 17 U/L (ref 1–32)
BASOPHILS # BLD AUTO: 0.04 10*3/MM3 (ref 0–0.2)
BASOPHILS NFR BLD AUTO: 0.6 % (ref 0–1.5)
BILIRUB SERPL-MCNC: 0.2 MG/DL (ref 0–1.2)
BUN SERPL-MCNC: 7 MG/DL (ref 6–20)
BUN/CREAT SERPL: 9.7 (ref 7–25)
CALCIUM SPEC-SCNC: 8.8 MG/DL (ref 8.6–10.5)
CHLORIDE SERPL-SCNC: 105 MMOL/L (ref 98–107)
CHOLEST SERPL-MCNC: 167 MG/DL (ref 0–200)
CO2 SERPL-SCNC: 25 MMOL/L (ref 22–29)
CREAT SERPL-MCNC: 0.72 MG/DL (ref 0.57–1)
DEPRECATED RDW RBC AUTO: 38.7 FL (ref 37–54)
EGFRCR SERPLBLD CKD-EPI 2021: 102.6 ML/MIN/1.73
EOSINOPHIL # BLD AUTO: 0.09 10*3/MM3 (ref 0–0.4)
EOSINOPHIL NFR BLD AUTO: 1.3 % (ref 0.3–6.2)
ERYTHROCYTE [DISTWIDTH] IN BLOOD BY AUTOMATED COUNT: 12.6 % (ref 12.3–15.4)
GLOBULIN UR ELPH-MCNC: 2.4 GM/DL
GLUCOSE SERPL-MCNC: 99 MG/DL (ref 65–99)
HBA1C MFR BLD: 5.4 % (ref 4.8–5.6)
HCT VFR BLD AUTO: 42.1 % (ref 34–46.6)
HCV AB SER DONR QL: NORMAL
HDLC SERPL-MCNC: 47 MG/DL (ref 40–60)
HGB BLD-MCNC: 14.3 G/DL (ref 12–15.9)
IMM GRANULOCYTES # BLD AUTO: 0.03 10*3/MM3 (ref 0–0.05)
IMM GRANULOCYTES NFR BLD AUTO: 0.4 % (ref 0–0.5)
LDLC SERPL CALC-MCNC: 104 MG/DL (ref 0–100)
LDLC/HDLC SERPL: 2.18 {RATIO}
LYMPHOCYTES # BLD AUTO: 2.35 10*3/MM3 (ref 0.7–3.1)
LYMPHOCYTES NFR BLD AUTO: 34.7 % (ref 19.6–45.3)
MCH RBC QN AUTO: 29 PG (ref 26.6–33)
MCHC RBC AUTO-ENTMCNC: 34 G/DL (ref 31.5–35.7)
MCV RBC AUTO: 85.4 FL (ref 79–97)
MONOCYTES # BLD AUTO: 0.58 10*3/MM3 (ref 0.1–0.9)
MONOCYTES NFR BLD AUTO: 8.6 % (ref 5–12)
NEUTROPHILS NFR BLD AUTO: 3.69 10*3/MM3 (ref 1.7–7)
NEUTROPHILS NFR BLD AUTO: 54.4 % (ref 42.7–76)
NRBC BLD AUTO-RTO: 0 /100 WBC (ref 0–0.2)
PLATELET # BLD AUTO: 263 10*3/MM3 (ref 140–450)
PMV BLD AUTO: 11.4 FL (ref 6–12)
POTASSIUM SERPL-SCNC: 4 MMOL/L (ref 3.5–5.2)
PROT SERPL-MCNC: 6.9 G/DL (ref 6–8.5)
RBC # BLD AUTO: 4.93 10*6/MM3 (ref 3.77–5.28)
SODIUM SERPL-SCNC: 139 MMOL/L (ref 136–145)
TRIGL SERPL-MCNC: 88 MG/DL (ref 0–150)
TSH SERPL DL<=0.05 MIU/L-ACNC: 0.91 UIU/ML (ref 0.27–4.2)
VLDLC SERPL-MCNC: 16 MG/DL (ref 5–40)
WBC NRBC COR # BLD: 6.78 10*3/MM3 (ref 3.4–10.8)

## 2023-06-09 PROCEDURE — 83036 HEMOGLOBIN GLYCOSYLATED A1C: CPT

## 2023-06-09 PROCEDURE — 86803 HEPATITIS C AB TEST: CPT

## 2023-06-09 PROCEDURE — 80050 GENERAL HEALTH PANEL: CPT

## 2023-06-09 PROCEDURE — 80061 LIPID PANEL: CPT

## 2023-06-09 PROCEDURE — 36415 COLL VENOUS BLD VENIPUNCTURE: CPT

## 2023-06-09 RX ORDER — DESVENLAFAXINE SUCCINATE 50 MG/1
50 TABLET, EXTENDED RELEASE ORAL DAILY
Qty: 30 TABLET | Refills: 3 | Status: SHIPPED | OUTPATIENT
Start: 2023-06-09

## 2023-06-09 RX ORDER — BUSPIRONE HYDROCHLORIDE 5 MG/1
5 TABLET ORAL 3 TIMES DAILY PRN
Qty: 90 TABLET | Refills: 3 | Status: SHIPPED | OUTPATIENT
Start: 2023-06-09

## 2023-06-09 NOTE — PROGRESS NOTES
Subjective   Natalia Hughes is a 48 y.o. female.     History of Present Illness  CC: Establish care/annual follow-up-anxiety, depression, headaches, seasonal allergies  Anxiety  Presents for initial visit. Onset was 6 to 12 months ago. The problem has been waxing and waning. Symptoms include depressed mood, excessive worry, irritability, nervous/anxious behavior and restlessness. Patient reports no chest pain, dizziness, nausea, palpitations, shortness of breath or suicidal ideas. Symptoms occur most days. The severity of symptoms is moderate. Exacerbated by: Life stressors. The quality of sleep is fair. Nighttime awakenings: occasional.     There are no known risk factors. Her past medical history is significant for depression. There is no history of anxiety/panic attacks or suicide attempts. Past treatments include nothing.   Depression  Visit Type: initial  Onset of symptoms: more than 6 months ago  Progression since onset: waxing and waning  Patient presents with the following symptoms: depressed mood, excessive worry, irritability, nervousness/anxiety and restlessness.  Patient is not experiencing: palpitations, shortness of breath, suicidal ideas, weight gain and weight loss.  Frequency of symptoms: occasionally   Severity: mild   Sleep quality: fair  Nighttime awakenings: occasional  No history of: anxiety/panic attacks, depression and suicide attempt  Treatment tried: nothing    Headache  Headache pattern:  Headache sometimes there, sometimes not at all  Initial event:  Stressful life event     The following portions of the patient's history were reviewed and updated as appropriate: allergies, current medications, past family history, past medical history, past social history, past surgical history, and problem list.    Review of Systems   Constitutional:  Positive for irritability. Negative for activity change, appetite change, chills, diaphoresis, fatigue, fever, unexpected weight gain and unexpected  weight loss.   HENT:  Negative for congestion, sore throat, trouble swallowing and voice change.    Eyes:  Negative for blurred vision, double vision, photophobia, pain and visual disturbance.   Respiratory:  Negative for cough, chest tightness, shortness of breath and wheezing.    Cardiovascular:  Negative for chest pain, palpitations and leg swelling.   Gastrointestinal:  Negative for abdominal distention, abdominal pain, anal bleeding, blood in stool, constipation, diarrhea, nausea, vomiting, GERD and indigestion.   Endocrine: Negative for cold intolerance, heat intolerance, polydipsia, polyphagia and polyuria.   Genitourinary:  Negative for dysuria, frequency, hematuria and urgency.   Musculoskeletal:  Negative for arthralgias and myalgias.   Skin:  Negative for rash.   Allergic/Immunologic: Negative.    Neurological:  Positive for headache (triggered by stress and chronic sinus issues.). Negative for dizziness, syncope, weakness and light-headedness.   Hematological: Negative.    Psychiatric/Behavioral:  Positive for depressed mood and stress. Negative for suicidal ideas. The patient is nervous/anxious.      Objective   Physical Exam  Vitals and nursing note reviewed.   Constitutional:       General: She is not in acute distress.     Appearance: Normal appearance. She is well-developed and normal weight. She is not ill-appearing, toxic-appearing or diaphoretic.   HENT:      Head: Normocephalic and atraumatic.      Right Ear: External ear normal.      Left Ear: External ear normal.      Nose: Nose normal.   Eyes:      Conjunctiva/sclera: Conjunctivae normal.      Pupils: Pupils are equal, round, and reactive to light.   Neck:      Thyroid: No thyromegaly.      Vascular: No carotid bruit.      Trachea: No tracheal deviation.   Cardiovascular:      Rate and Rhythm: Normal rate and regular rhythm.      Heart sounds: Normal heart sounds. No murmur heard.    No friction rub. No gallop.   Pulmonary:      Effort:  Pulmonary effort is normal. No respiratory distress.      Breath sounds: Normal breath sounds. No stridor. No wheezing, rhonchi or rales.   Abdominal:      General: Bowel sounds are normal. There is no distension.      Palpations: Abdomen is soft. There is no mass.      Tenderness: There is no abdominal tenderness. There is no guarding or rebound.      Hernia: No hernia is present.   Musculoskeletal:         General: No tenderness. Normal range of motion.      Cervical back: Normal range of motion and neck supple.   Lymphadenopathy:      Cervical: No cervical adenopathy.   Skin:     General: Skin is warm and dry.      Coloration: Skin is not pale.      Findings: No erythema or rash.   Neurological:      Mental Status: She is alert and oriented to person, place, and time.      Cranial Nerves: No cranial nerve deficit.      Coordination: Coordination normal.   Psychiatric:         Attention and Perception: Attention and perception normal.         Mood and Affect: Mood normal. Mood is anxious and depressed.         Behavior: Behavior normal. Behavior is cooperative.         Thought Content: Thought content normal. Thought content is not paranoid or delusional. Thought content does not include homicidal or suicidal ideation. Thought content does not include homicidal or suicidal plan.         Cognition and Memory: Cognition and memory normal.         Judgment: Judgment normal.       Assessment & Plan   Diagnoses and all orders for this visit:    1. Encounter to establish care (Primary)    2. Annual physical exam  -     Hepatitis C Antibody; Future  -     CBC & Differential; Future  -     Comprehensive Metabolic Panel; Future  -     Hemoglobin A1c; Future  -     Lipid Panel; Future  -     TSH; Future, will call with results.    3. Need for hepatitis C screening test  -     Hepatitis C Antibody; Future, will call with results.    4. Anxiety  -     desvenlafaxine (Pristiq) 50 MG 24 hr tablet; Take 1 tablet by mouth Daily.   Dispense: 30 tablet; Refill: 3  -     busPIRone (BUSPAR) 5 MG tablet; Take 1 tablet by mouth 3 (Three) Times a Day As Needed (anxiety).  Dispense: 90 tablet; Refill: 3   -Patient reports stress and anxiety symptoms with mild depression due to many life stressors over the last 3 years but specifically over the last 6 to 12 months.  Denies suicidal homicidal ideations.  She is agreeable to medication treatment at this time but declines offer for therapy or counseling.  We will start patient on Pristiq 50 mg daily and BuSpar 5 mg 1 tablet 3 times a day as needed.  Instructed patient to stop medication should symptoms worsen.  Present to the ER for suicidal homicidal ideations.  Patient verbalized understanding and agrees with plan of care.    5. Major depressive disorder, recurrent, mild  -     desvenlafaxine (Pristiq) 50 MG 24 hr tablet; Take 1 tablet by mouth Daily.  Dispense: 30 tablet; Refill: 3  -     busPIRone (BUSPAR) 5 MG tablet; Take 1 tablet by mouth 3 (Three) Times a Day As Needed (anxiety).  Dispense: 90 tablet; Refill: 3   -Depression symptoms milder than anxiety symptoms.  No suicidal homicidal ideations.  Plan of care stated above #4.    6. Chronic nonintractable headache, unspecified headache type   -Headache symptoms appear to be triggered by combination of stress and allergies.  Patient currently sees an allergist for treatment of her allergies.  Anxiety and depression treatment as noted above in #4.  We will continue to monitor.    7. Seasonal allergies   -Stable.  Follow-up with allergist as scheduled.    8.  Follow-up in 6 weeks or sooner for any acute needs.            This document has been electronically signed by KEISHA Alaniz on June 9, 2023 13:26 CDT

## 2023-06-19 NOTE — PROGRESS NOTES
LDL slightly elevated.  Low-fat diet recommended.  Labs otherwise stable or within normal limits.  Follow-up as scheduled.